# Patient Record
Sex: FEMALE | Race: WHITE | NOT HISPANIC OR LATINO | Employment: OTHER | URBAN - METROPOLITAN AREA
[De-identification: names, ages, dates, MRNs, and addresses within clinical notes are randomized per-mention and may not be internally consistent; named-entity substitution may affect disease eponyms.]

---

## 2024-05-13 ENCOUNTER — OFFICE VISIT (OUTPATIENT)
Dept: CARDIAC SURGERY | Facility: CLINIC | Age: 89
End: 2024-05-13
Payer: MEDICARE

## 2024-05-13 ENCOUNTER — OFFICE VISIT (OUTPATIENT)
Dept: NEUROSURGERY | Facility: CLINIC | Age: 89
End: 2024-05-13
Payer: MEDICARE

## 2024-05-13 VITALS
HEART RATE: 96 BPM | WEIGHT: 209 LBS | HEIGHT: 66 IN | TEMPERATURE: 97.5 F | DIASTOLIC BLOOD PRESSURE: 100 MMHG | BODY MASS INDEX: 33.59 KG/M2 | SYSTOLIC BLOOD PRESSURE: 160 MMHG | OXYGEN SATURATION: 95 %

## 2024-05-13 VITALS
TEMPERATURE: 97.8 F | WEIGHT: 209.4 LBS | SYSTOLIC BLOOD PRESSURE: 192 MMHG | HEART RATE: 110 BPM | OXYGEN SATURATION: 94 % | DIASTOLIC BLOOD PRESSURE: 110 MMHG

## 2024-05-13 DIAGNOSIS — I65.22 CAROTID STENOSIS, ASYMPTOMATIC, LEFT: Primary | ICD-10-CM

## 2024-05-13 DIAGNOSIS — I65.23 CAROTID STENOSIS, SYMPTOMATIC W/O INFARCT, BILATERAL: Primary | ICD-10-CM

## 2024-05-13 PROCEDURE — 1160F RVW MEDS BY RX/DR IN RCRD: CPT | Performed by: RADIOLOGY

## 2024-05-13 PROCEDURE — 1159F MED LIST DOCD IN RCRD: CPT | Performed by: RADIOLOGY

## 2024-05-13 PROCEDURE — 99204 OFFICE O/P NEW MOD 45 MIN: CPT | Performed by: THORACIC SURGERY (CARDIOTHORACIC VASCULAR SURGERY)

## 2024-05-13 PROCEDURE — 99204 OFFICE O/P NEW MOD 45 MIN: CPT | Performed by: RADIOLOGY

## 2024-05-13 RX ORDER — SODIUM CHLORIDE 9 MG/ML
40 INJECTION, SOLUTION INTRAVENOUS AS NEEDED
OUTPATIENT
Start: 2024-05-13

## 2024-05-13 RX ORDER — METOPROLOL SUCCINATE 25 MG/1
25 TABLET, EXTENDED RELEASE ORAL DAILY
COMMUNITY

## 2024-05-13 RX ORDER — MECLIZINE HCL 12.5 MG/1
12.5 TABLET ORAL 3 TIMES DAILY PRN
COMMUNITY
Start: 2024-03-06

## 2024-05-13 RX ORDER — CLOPIDOGREL BISULFATE 75 MG/1
75 TABLET ORAL DAILY
Qty: 30 TABLET | Refills: 3 | Status: SHIPPED | OUTPATIENT
Start: 2024-05-13

## 2024-05-13 RX ORDER — SODIUM CHLORIDE 0.9 % (FLUSH) 0.9 %
10 SYRINGE (ML) INJECTION EVERY 12 HOURS SCHEDULED
OUTPATIENT
Start: 2024-05-13

## 2024-05-13 RX ORDER — PROPAFENONE HYDROCHLORIDE 150 MG/1
1 TABLET, COATED ORAL 3 TIMES DAILY
COMMUNITY
Start: 2024-04-16

## 2024-05-13 RX ORDER — EVOLOCUMAB 140 MG/ML
INJECTION, SOLUTION SUBCUTANEOUS
COMMUNITY

## 2024-05-13 RX ORDER — ONDANSETRON 4 MG/1
4 TABLET, ORALLY DISINTEGRATING ORAL
COMMUNITY
Start: 2024-03-19

## 2024-05-13 RX ORDER — LORATADINE 10 MG/1
1 TABLET ORAL DAILY
COMMUNITY

## 2024-05-13 RX ORDER — DIPHENOXYLATE HYDROCHLORIDE AND ATROPINE SULFATE 2.5; .025 MG/1; MG/1
TABLET ORAL EVERY 24 HOURS
COMMUNITY

## 2024-05-13 RX ORDER — VALSARTAN 320 MG/1
320 TABLET ORAL DAILY
COMMUNITY

## 2024-05-13 RX ORDER — POTASSIUM CHLORIDE 750 MG/1
10 CAPSULE, EXTENDED RELEASE ORAL DAILY
COMMUNITY
Start: 2024-05-01

## 2024-05-13 RX ORDER — PRAVASTATIN SODIUM 80 MG/1
80 TABLET ORAL
COMMUNITY

## 2024-05-13 RX ORDER — SODIUM CHLORIDE 9 MG/ML
50 INJECTION, SOLUTION INTRAVENOUS CONTINUOUS
OUTPATIENT
Start: 2024-05-13

## 2024-05-13 RX ORDER — SODIUM CHLORIDE 0.9 % (FLUSH) 0.9 %
1-10 SYRINGE (ML) INJECTION AS NEEDED
OUTPATIENT
Start: 2024-05-13

## 2024-05-13 RX ORDER — HYDRALAZINE HYDROCHLORIDE 25 MG/1
25 TABLET, FILM COATED ORAL
COMMUNITY
Start: 2024-04-29

## 2024-05-13 RX ORDER — FUROSEMIDE 20 MG/1
20 TABLET ORAL DAILY PRN
COMMUNITY
Start: 2024-05-01

## 2024-05-13 RX ORDER — RIVAROXABAN 20 MG/1
20 TABLET, FILM COATED ORAL
COMMUNITY

## 2024-05-13 NOTE — PROGRESS NOTES
05/13/2024  Patient Information  Kim Ramsay                                                                                          [address]  [place] [address]   10/28/1934  'PCP/Referring Physician'  Geno CornellDO  [phone]  Galo Rubin MD  [phone]  Chief Complaint   Patient presents with    Carotid Artery Disease     Referred by Dr. Galo Rubin for bilateral carotid stenosis. Patient has episodes of dizziness with nausea, hx of A-fib.        History of Present Illness:   The patient is a 79-year-old female who is followed by Dr. Rubni after a heart attack.  She has a history of A-fib and she has been having dizziness.  She had a carotid duplex which reveals bilateral carotid disease and underwent a CTA of her carotid arteries.  This revealed 80% right internal carotid artery stenosis and 90% left.  She has been referred for evaluation and possible treatment of this.  She does have severe ankle edema.      Patient Active Problem List   Diagnosis    Carotid stenosis, asymptomatic, left     Past Medical History:   Diagnosis Date    Arthritis     Atrial fibrillation     Deep vein thrombosis     Hyperlipidemia     Hypertension     Myocardial infarction     Pre-diabetes     UTI (urinary tract infection)      Past Surgical History:   Procedure Laterality Date    CATARACT EXTRACTION Bilateral     CHOLECYSTECTOMY      HYSTERECTOMY         Current Outpatient Medications:     furosemide (LASIX) 20 MG tablet, Take 1 tablet by mouth Daily As Needed., Disp: , Rfl:     hydrALAZINE (APRESOLINE) 25 MG tablet, Take 1 tablet by mouth., Disp: , Rfl:     loratadine (CLARITIN) 10 MG tablet, Take 1 tablet by mouth Daily., Disp: , Rfl:     meclizine (ANTIVERT) 12.5 MG tablet, Take 1 tablet by mouth 3 (Three) Times a Day As Needed for Dizziness., Disp: , Rfl:     metoprolol succinate XL (TOPROL-XL) 25 MG 24 hr tablet, Take 1 tablet by mouth Daily., Disp: , Rfl:     ondansetron ODT  (ZOFRAN-ODT) 4 MG disintegrating tablet, 1 tablet., Disp: , Rfl:     potassium chloride (MICRO-K) 10 MEQ CR capsule, Take 1 capsule by mouth Daily., Disp: , Rfl:     pravastatin (PRAVACHOL) 80 MG tablet, Take 1 tablet by mouth every night at bedtime., Disp: , Rfl:     propafenone (RYTHMOL) 150 MG tablet, Take 1 tablet by mouth 3 times a day., Disp: , Rfl:     Repatha SureClick solution auto-injector SureClick injection, INJECT 1 ML SUBCUTANEOUSLY EVERY TWO WEEKS, Disp: , Rfl:     valsartan (DIOVAN) 320 MG tablet, Take 1 tablet by mouth Daily., Disp: , Rfl:     Xarelto 20 MG tablet, 1 tablet., Disp: , Rfl:     clopidogrel (Plavix) 75 MG tablet, Take 1 tablet by mouth Daily. Start taking 5 days before procedure, Disp: 30 tablet, Rfl: 3    multivitamin (THERAGRAN) tablet tablet, Daily., Disp: , Rfl:   Allergies   Allergen Reactions    Ciprofloxacin Other (See Comments)     Tightness in chest      Sulfamethoxazole-Trimethoprim GI Intolerance     Social History     Socioeconomic History    Marital status:     Number of children: 2   Tobacco Use    Smoking status: Never    Smokeless tobacco: Never   Vaping Use    Vaping status: Never Used   Substance and Sexual Activity    Alcohol use: Never    Drug use: Never    Sexual activity: Defer     Family History   Problem Relation Age of Onset    Heart attack Mother     Stroke Mother     Breast cancer Mother     COPD Father     Aneurysm Brother     Heart attack Brother      Review of Systems   Constitutional: Negative for chills, decreased appetite, diaphoresis, fever, malaise/fatigue, night sweats, weight gain and weight loss.   HENT:  Negative for hoarse voice.    Eyes:  Negative for blurred vision, double vision and visual disturbance.   Cardiovascular:  Positive for dyspnea on exertion and leg swelling. Negative for chest pain, claudication, irregular heartbeat, near-syncope, orthopnea, palpitations, paroxysmal nocturnal dyspnea and syncope.   Respiratory:  Negative  for cough, hemoptysis, shortness of breath, sputum production and wheezing.    Hematologic/Lymphatic: Negative for adenopathy and bleeding problem. Does not bruise/bleed easily.   Skin:  Negative for color change, nail changes, poor wound healing and rash.   Musculoskeletal:  Positive for arthritis. Negative for back pain, falls and muscle cramps.   Gastrointestinal:  Negative for abdominal pain, dysphagia and heartburn.   Genitourinary:  Negative for flank pain.   Neurological:  Positive for dizziness, light-headedness and loss of balance. Negative for brief paralysis, disturbances in coordination, focal weakness, headaches, numbness, paresthesias, sensory change, vertigo and weakness.   Psychiatric/Behavioral:  Negative for depression and suicidal ideas.    Allergic/Immunologic: Negative for persistent infections.       Vitals:    05/13/24 1222   BP: (!) 192/110   BP Location: Left arm   Patient Position: Sitting   Pulse: 110   Temp: 97.8 °F (36.6 °C)   SpO2: 94%   Weight: 95 kg (209 lb 6.4 oz)      Physical Exam  Vitals and nursing note reviewed.   Constitutional:       General: She is not in acute distress.     Appearance: She is well-developed.   HENT:      Head: Normocephalic.   Eyes:      Conjunctiva/sclera: Conjunctivae normal.      Pupils: Pupils are equal, round, and reactive to light.   Neck:      Thyroid: No thyroid mass or thyromegaly.   Cardiovascular:      Rate and Rhythm: Normal rate.      Pulses:           Carotid pulses are  on the right side with bruit and  on the left side with bruit.     Heart sounds: No murmur heard.     No friction rub. No gallop.   Pulmonary:      Breath sounds: No wheezing, rhonchi or rales.   Abdominal:      General: Bowel sounds are normal. There is no distension.      Palpations: Abdomen is soft. There is no mass.      Tenderness: There is no abdominal tenderness.   Musculoskeletal:         General: No deformity. Normal range of motion.      Cervical back: Normal range of  motion.   Skin:     Findings: No petechiae.      Nails: There is no clubbing.   Neurological:      Mental Status: She is oriented to person, place, and time.      Cranial Nerves: No cranial nerve deficit.      Sensory: No sensory deficit.   Psychiatric:         Behavior: Behavior normal.         The ROS, past medical history, surgical history, family history, social history, and vitals were reviewed by myself and corrected as needed.      Labs/Imaging:  I have obtained and reviewed the medical records from Dr. Rubin including the CTA scan demonstrating severe bilateral carotid artery disease.     Assessment/Plan:   The patient is an 89-year-old female who has A-fib and severe dizziness.  She has severe bilateral carotid artery disease.  I reviewed her CTA and concur with this interpretation.  I have reviewed this also with Dr. Sanjay Sneed, our neuroradiologist.  He will see the patient today and discuss possible catheter-based intervention for this.  In view of her A-fib, ankle edema and her age, this may be the best option.  We will get Dr. Sneed's opinion.  I have not ruled out surgery but certainly, if he feels comfortable, it might be the best option.  I discussed that with both her and her daughters and they appear to understand and agreeable. The patient does not have an advance directive on file at this time. I would like to thank you for this consultation.    Patient Active Problem List   Diagnosis    Carotid stenosis, asymptomatic, left     CC: DO Galo Valerio MD Regina Fugate editing for Edgar Kunz MD       I, Edgar Kunz MD, have read and agree with the editing done by Shaista Younger, .

## 2024-05-13 NOTE — H&P (VIEW-ONLY)
"NAME: ROSA CORTEZ   DOS: 2024  : 10/28/1934  PCP: Geno Cornell DO    Chief Complaint:    Chief Complaint   Patient presents with    carotid stenosis       History of Present Illness:  89 y.o. female being seen for high-grade bilateral carotid stenoses.  Ms. Cortez has been having recurrent episodes (nearly weekly) of dizziness with accompanying nausea/vomiting.  As part of her workup/evaluation she underwent CT angiography which suggests high-grade bilateral carotid stenoses.  She was seen in the CT surgery clinic today (Dr. Kunz), but given her age and comorbidities (atrial fibrillation, coronary artery disease), she was deemed a \"high surgical risk\" for endarterectomy.  She denies any localizing neurologic symptoms, specifically no unilateral weakness/numbness, no speech or vision changes.  She is on a chronic Xarelto anticoagulation regimen.  I am seeing her in consultation regarding her carotid occlusive disease.    Past Medical History:  Past Medical History:   Diagnosis Date    Arthritis     Atrial fibrillation     Deep vein thrombosis     Hyperlipidemia     Hypertension     Myocardial infarction     Pre-diabetes     UTI (urinary tract infection)        Past Surgical History:  Past Surgical History:   Procedure Laterality Date    CATARACT EXTRACTION Bilateral     CHOLECYSTECTOMY      HYSTERECTOMY         Review of Systems:        Review of Systems   Constitutional:  Positive for fatigue. Negative for activity change, appetite change, chills, diaphoresis, fever and unexpected weight change.   HENT:  Negative for congestion, dental problem, drooling, ear discharge, ear pain, facial swelling, hearing loss, mouth sores, nosebleeds, postnasal drip, rhinorrhea, sinus pressure, sinus pain, sneezing, sore throat, tinnitus, trouble swallowing and voice change.    Eyes:  Negative for photophobia, pain, discharge, redness, itching and visual disturbance.   Respiratory:  Positive for " shortness of breath. Negative for apnea, cough, choking, chest tightness, wheezing and stridor.    Cardiovascular:  Positive for leg swelling. Negative for chest pain and palpitations.   Gastrointestinal:  Positive for nausea. Negative for abdominal distention, abdominal pain, anal bleeding, blood in stool, constipation, diarrhea, rectal pain and vomiting.   Endocrine: Negative for cold intolerance, heat intolerance, polydipsia, polyphagia and polyuria.   Genitourinary:  Negative for decreased urine volume, difficulty urinating, dysuria, enuresis, flank pain, frequency, genital sores, hematuria and urgency.   Musculoskeletal:  Negative for arthralgias, back pain, gait problem, joint swelling, myalgias, neck pain and neck stiffness.   Skin:  Negative for color change, pallor, rash and wound.   Allergic/Immunologic: Negative for environmental allergies, food allergies and immunocompromised state.   Neurological:  Negative for dizziness, tremors, seizures, syncope, facial asymmetry, speech difficulty, weakness, light-headedness, numbness and headaches.   Hematological:  Negative for adenopathy. Does not bruise/bleed easily.   Psychiatric/Behavioral:  Negative for agitation, behavioral problems, confusion, decreased concentration, dysphoric mood, hallucinations, self-injury, sleep disturbance and suicidal ideas. The patient is not nervous/anxious and is not hyperactive.         Medications    Current Outpatient Medications:     furosemide (LASIX) 20 MG tablet, Take 1 tablet by mouth Daily As Needed., Disp: , Rfl:     hydrALAZINE (APRESOLINE) 25 MG tablet, Take 1 tablet by mouth., Disp: , Rfl:     loratadine (CLARITIN) 10 MG tablet, Take 1 tablet by mouth Daily., Disp: , Rfl:     meclizine (ANTIVERT) 12.5 MG tablet, Take 1 tablet by mouth 3 (Three) Times a Day As Needed for Dizziness., Disp: , Rfl:     metoprolol succinate XL (TOPROL-XL) 25 MG 24 hr tablet, Take 1 tablet by mouth Daily., Disp: , Rfl:     multivitamin  (THERAGRAN) tablet tablet, Daily., Disp: , Rfl:     ondansetron ODT (ZOFRAN-ODT) 4 MG disintegrating tablet, 1 tablet., Disp: , Rfl:     potassium chloride (MICRO-K) 10 MEQ CR capsule, Take 1 capsule by mouth Daily., Disp: , Rfl:     pravastatin (PRAVACHOL) 80 MG tablet, Take 1 tablet by mouth every night at bedtime., Disp: , Rfl:     propafenone (RYTHMOL) 150 MG tablet, Take 1 tablet by mouth 3 times a day., Disp: , Rfl:     Repatha SureClick solution auto-injector SureClick injection, INJECT 1 ML SUBCUTANEOUSLY EVERY TWO WEEKS, Disp: , Rfl:     valsartan (DIOVAN) 320 MG tablet, Take 1 tablet by mouth Daily., Disp: , Rfl:     Xarelto 20 MG tablet, 1 tablet., Disp: , Rfl:     clopidogrel (Plavix) 75 MG tablet, Take 1 tablet by mouth Daily. Start taking 5 days before procedure, Disp: 30 tablet, Rfl: 3    Allergies:  Allergies   Allergen Reactions    Ciprofloxacin Other (See Comments)     Tightness in chest      Sulfamethoxazole-Trimethoprim GI Intolerance       Social Hx:  Social History     Tobacco Use    Smoking status: Never    Smokeless tobacco: Never   Vaping Use    Vaping status: Never Used   Substance Use Topics    Alcohol use: Never    Drug use: Never       Family Hx:  Family History   Problem Relation Age of Onset    Heart attack Mother     Stroke Mother     Breast cancer Mother     COPD Father     Aneurysm Brother     Heart attack Brother        Review of Imaging:  Outside CT angiogram dated 3/19/2024 from [facility] was reviewed along with its corresponding radiologic report.  This study does demonstrate extensive calcific and lipid-laden plaque at the bilateral carotid bifurcations (left greater than right).  The calcific nature of the plaque makes it challenging to accurately depict the severity of stenosis, but I do think there is a high-grade (greater than 90%) left ICA origin stenosis.  I am hoping that the calcific nature is overestimating the severity of stenosis at the right  "carotid bifurcation, but this is ordinarily at least borderline hemodynamically significant (70%) in nature.  Her vertebrobasilar system appears widely patent, without lesion to suggest a vertebrobasilar insufficiency.    Physical Examination:  Vitals:    05/13/24 1344   BP: 160/100   Pulse: 96   Temp: 97.5 °F (36.4 °C)   SpO2: 95%        General Appearance:   Well developed, well nourished, well groomed, alert, and cooperative.  Cardiovascular: Regular rate and rhythm. Soft left carotid bruit.      Neurological examination:  Nonfocal neurologic exam.  Speech is clear.  No facial droop or pronator drift.  I do not appreciate any gross visual field deficits.  She has 3+/5 strength in the bilateral upper and lower extremities.  She does ambulate with assistance of a cane for stability purposes and secondary to arthritis in her knees.    Diagnoses/Plan:    Ms. Ramsay is a 89 y.o. female high-grade, bilateral (left greater than right) carotid stenosis found during evaluation of episodes of dizziness and nausea.  I have reviewed the CT angiogram, and I do think that she has a high-grade (greater than 90%) left carotid stenosis, but I am hoping that the calcific nature of plaque is overestimating the severity of stenosis at the right carotid bifurcation.  I do not appreciate any vertebrobasilar lesions that would be contributing to any sort of vertebrobasilar insufficiency or account for her dizziness.  She was seen by CT surgery earlier today, and given her cardiac comorbidities, she is deemed a \"high surgical risk\" for endarterectomy.  As such, had a lengthy discussion with Ms. Ramsay and family in regards to treatment options for what is most likely asymptomatic left carotid stenosis, to include continued conservative management versus angioplasty/stent placement.  I again reiterated that it was doubtful that her carotids were contributing to her symptoms, and that the main reason for treating her left carotid " stenosis would be to decrease her risk of stroke.  They expressed an understanding, and after deliberation, they wish to proceed accordingly with diagnostic carotid angiography along with left carotid angioplasty/stent placement.  She will need to add Plavix to her Xarelto regimen leading up to the procedure, and we called in a prescription to her pharmacy.  We will tentatively schedule her for diagnostic angiogram and left carotid stent placement (via femoral access) in the next couple of weeks or so.  During the interim, she will contact our office in/or call 911 if she experiences any stroke or TIA-like symptoms.

## 2024-05-17 ENCOUNTER — TELEPHONE (OUTPATIENT)
Dept: CARDIAC SURGERY | Facility: CLINIC | Age: 89
End: 2024-05-17
Payer: MEDICARE

## 2024-05-24 ENCOUNTER — HOSPITAL ENCOUNTER (INPATIENT)
Facility: HOSPITAL | Age: 89
LOS: 1 days | Discharge: HOME | DRG: 036 | End: 2024-05-25
Attending: RADIOLOGY | Admitting: RADIOLOGY
Payer: MEDICARE

## 2024-05-24 ENCOUNTER — APPOINTMENT (OUTPATIENT)
Dept: CARDIOLOGY | Facility: HOSPITAL | Age: 89
DRG: 036 | End: 2024-05-24
Payer: MEDICARE

## 2024-05-24 DIAGNOSIS — I65.22 CAROTID STENOSIS, ASYMPTOMATIC, LEFT: ICD-10-CM

## 2024-05-24 PROBLEM — I10 HTN (HYPERTENSION): Status: ACTIVE | Noted: 2024-05-24

## 2024-05-24 PROBLEM — I10 HTN (HYPERTENSION): Chronic | Status: ACTIVE | Noted: 2024-05-24

## 2024-05-24 PROBLEM — I48.91 A-FIB: Chronic | Status: ACTIVE | Noted: 2024-05-24

## 2024-05-24 PROBLEM — E78.5 HYPERLIPIDEMIA LDL GOAL <100: Status: ACTIVE | Noted: 2024-05-24

## 2024-05-24 PROBLEM — R73.03 PREDIABETES: Status: ACTIVE | Noted: 2024-05-24

## 2024-05-24 PROBLEM — R73.03 PREDIABETES: Chronic | Status: ACTIVE | Noted: 2024-05-24

## 2024-05-24 PROBLEM — E78.5 HYPERLIPIDEMIA LDL GOAL <100: Chronic | Status: ACTIVE | Noted: 2024-05-24

## 2024-05-24 LAB
ANION GAP SERPL CALCULATED.3IONS-SCNC: 7 MMOL/L (ref 5–15)
BH CV LEFT CCA HIDDEN LRR: 1 CM/S
BH CV VAS CAROTID LEFT DISTAL STENT EDV: 16.8 CM/S
BH CV VAS CAROTID LEFT DISTAL STENT: 78.9 CM/S
BH CV VAS CAROTID LEFT DISTAL TO STENT EDV: 16.8 CM/S
BH CV VAS CAROTID LEFT DISTAL TO STENT: 100 CM/S
BH CV VAS CAROTID LEFT MID STENT EDV: 19.3 CM/S
BH CV VAS CAROTID LEFT MID STENT: 82 CM/S
BH CV VAS CAROTID LEFT PROXIMAL STENT EDV: 11.2 CM/S
BH CV VAS CAROTID LEFT PROXIMAL STENT: 61.5 CM/S
BH CV VAS CAROTID LEFT PROXIMAL TO STENT: 83.9 CM/S
BH CV VAS CAROTID LEFT STENT NATIVE VESSEL PROXIMAL ED: 13 CM/S
BH CV XLRA MEAS LEFT DIST CCA EDV: 13 CM/SEC
BH CV XLRA MEAS LEFT DIST CCA PSV: 83.9 CM/SEC
BH CV XLRA MEAS LEFT DIST ICA EDV: 19.3 CM/SEC
BH CV XLRA MEAS LEFT DIST ICA PSV: 73.9 CM/SEC
BH CV XLRA MEAS LEFT ICA/CCA RATIO: 1.55
BH CV XLRA MEAS LEFT MID CCA EDV: 17.4 CM/SEC
BH CV XLRA MEAS LEFT MID CCA PSV: 64.6 CM/SEC
BH CV XLRA MEAS LEFT MID ICA EDV: 16.8 CM/SEC
BH CV XLRA MEAS LEFT MID ICA PSV: 100 CM/SEC
BH CV XLRA MEAS LEFT PROX CCA PSV: 17.4 CM/SEC
BH CV XLRA MEAS LEFT PROX ECA PSV: 180 CM/SEC
BH CV XLRA MEAS LEFT PROX ICA EDV: 16.8 CM/SEC
BH CV XLRA MEAS LEFT PROX ICA PSV: 98.8 CM/SEC
BH CV XLRA MEAS LEFT PROX SCLA PSV: 83.9 CM/SEC
BH CV XLRA MEAS LEFT VERTEBRAL A EDV: 14.9 CM/SEC
BH CV XLRA MEAS LEFT VERTEBRAL A PSV: 57.8 CM/SEC
BH CV XLRA MEAS RIGHT DIST CCA EDV: 14.1 CM/SEC
BH CV XLRA MEAS RIGHT DIST CCA PSV: 103 CM/SEC
BH CV XLRA MEAS RIGHT DIST ICA EDV: 11.9 CM/SEC
BH CV XLRA MEAS RIGHT DIST ICA PSV: 48.7 CM/SEC
BH CV XLRA MEAS RIGHT ICA/CCA RATIO: 0.87
BH CV XLRA MEAS RIGHT MID CCA EDV: 19.8 CM/SEC
BH CV XLRA MEAS RIGHT MID CCA PSV: 156 CM/SEC
BH CV XLRA MEAS RIGHT MID ICA EDV: 18 CM/SEC
BH CV XLRA MEAS RIGHT MID ICA PSV: 111 CM/SEC
BH CV XLRA MEAS RIGHT PROX CCA EDV: 8.2 CM/SEC
BH CV XLRA MEAS RIGHT PROX CCA PSV: 59.6 CM/SEC
BH CV XLRA MEAS RIGHT PROX ECA PSV: 83.9 CM/SEC
BH CV XLRA MEAS RIGHT PROX ICA EDV: 24.1 CM/SEC
BH CV XLRA MEAS RIGHT PROX ICA PSV: 136 CM/SEC
BH CV XLRA MEAS RIGHT PROX SCLA PSV: 87 CM/SEC
BH CV XLRA MEAS RIGHT VERTEBRAL A EDV: 8.8 CM/SEC
BH CV XLRA MEAS RIGHT VERTEBRAL A PSV: 35.6 CM/SEC
BH CVPROX LEFT ICA HIDDEN LRR: 1 CM
BUN SERPL-MCNC: 11 MG/DL (ref 8–23)
BUN/CREAT SERPL: 11.5 (ref 7–25)
CALCIUM SPEC-SCNC: 9.6 MG/DL (ref 8.6–10.5)
CHLORIDE SERPL-SCNC: 99 MMOL/L (ref 98–107)
CO2 SERPL-SCNC: 29 MMOL/L (ref 22–29)
CREAT SERPL-MCNC: 0.96 MG/DL (ref 0.57–1)
DEPRECATED RDW RBC AUTO: 49.2 FL (ref 37–54)
EGFRCR SERPLBLD CKD-EPI 2021: 56.7 ML/MIN/1.73
ERYTHROCYTE [DISTWIDTH] IN BLOOD BY AUTOMATED COUNT: 14.3 % (ref 12.3–15.4)
GLUCOSE SERPL-MCNC: 98 MG/DL (ref 65–99)
HCT VFR BLD AUTO: 39.4 % (ref 34–46.6)
HGB BLD-MCNC: 12.8 G/DL (ref 12–15.9)
MCH RBC QN AUTO: 30.6 PG (ref 26.6–33)
MCHC RBC AUTO-ENTMCNC: 32.5 G/DL (ref 31.5–35.7)
MCV RBC AUTO: 94.3 FL (ref 79–97)
PLATELET # BLD AUTO: 251 10*3/MM3 (ref 140–450)
PMV BLD AUTO: 8.8 FL (ref 6–12)
POTASSIUM SERPL-SCNC: 4.1 MMOL/L (ref 3.5–5.2)
RBC # BLD AUTO: 4.18 10*6/MM3 (ref 3.77–5.28)
RIGHT ARM BP: NORMAL MMHG
SODIUM SERPL-SCNC: 135 MMOL/L (ref 136–145)
WBC NRBC COR # BLD AUTO: 5.01 10*3/MM3 (ref 3.4–10.8)

## 2024-05-24 PROCEDURE — C1884 EMBOLIZATION PROTECT SYST: HCPCS | Performed by: RADIOLOGY

## 2024-05-24 PROCEDURE — C1725 CATH, TRANSLUMIN NON-LASER: HCPCS | Performed by: RADIOLOGY

## 2024-05-24 PROCEDURE — B31RYZZ FLUOROSCOPY OF INTRACRANIAL ARTERIES USING OTHER CONTRAST: ICD-10-PCS | Performed by: RADIOLOGY

## 2024-05-24 PROCEDURE — 92610 EVALUATE SWALLOWING FUNCTION: CPT

## 2024-05-24 PROCEDURE — C1769 GUIDE WIRE: HCPCS | Performed by: RADIOLOGY

## 2024-05-24 PROCEDURE — 25010000002 HEPARIN (PORCINE) PER 1000 UNITS: Performed by: RADIOLOGY

## 2024-05-24 PROCEDURE — B315YZZ FLUOROSCOPY OF BILATERAL COMMON CAROTID ARTERIES USING OTHER CONTRAST: ICD-10-PCS | Performed by: RADIOLOGY

## 2024-05-24 PROCEDURE — 93880 EXTRACRANIAL BILAT STUDY: CPT

## 2024-05-24 PROCEDURE — 0 IODIXANOL PER 1 ML: Performed by: RADIOLOGY

## 2024-05-24 PROCEDURE — C1894 INTRO/SHEATH, NON-LASER: HCPCS | Performed by: RADIOLOGY

## 2024-05-24 PROCEDURE — 25810000003 SODIUM CHLORIDE 0.9 % SOLUTION: Performed by: PHYSICIAN ASSISTANT

## 2024-05-24 PROCEDURE — X2AJ336 CEREBRAL EMBOLIC FILTRATION, EXTRACORPOREAL FLOW REVERSAL CIRCUIT FROM LEFT COMMON CAROTID ARTERY, PERCUTANEOUS APPROACH, NEW TECHNOLOGY GROUP 6: ICD-10-PCS | Performed by: RADIOLOGY

## 2024-05-24 PROCEDURE — 93880 EXTRACRANIAL BILAT STUDY: CPT | Performed by: INTERNAL MEDICINE

## 2024-05-24 PROCEDURE — 037L3DZ DILATION OF LEFT INTERNAL CAROTID ARTERY WITH INTRALUMINAL DEVICE, PERCUTANEOUS APPROACH: ICD-10-PCS | Performed by: RADIOLOGY

## 2024-05-24 PROCEDURE — 37215 TRANSCATH STENT CCA W/EPS: CPT | Performed by: RADIOLOGY

## 2024-05-24 PROCEDURE — 36225 PLACE CATH SUBCLAVIAN ART: CPT | Performed by: RADIOLOGY

## 2024-05-24 PROCEDURE — 25810000003 SODIUM CHLORIDE 0.9 % SOLUTION: Performed by: RADIOLOGY

## 2024-05-24 PROCEDURE — 85027 COMPLETE CBC AUTOMATED: CPT | Performed by: RADIOLOGY

## 2024-05-24 PROCEDURE — 99232 SBSQ HOSP IP/OBS MODERATE 35: CPT

## 2024-05-24 PROCEDURE — C1876 STENT, NON-COA/NON-COV W/DEL: HCPCS | Performed by: RADIOLOGY

## 2024-05-24 PROCEDURE — 25810000003 SODIUM CHLORIDE 0.9 % SOLUTION

## 2024-05-24 PROCEDURE — 80048 BASIC METABOLIC PNL TOTAL CA: CPT | Performed by: RADIOLOGY

## 2024-05-24 PROCEDURE — C1760 CLOSURE DEV, VASC: HCPCS | Performed by: RADIOLOGY

## 2024-05-24 PROCEDURE — 25010000002 FENTANYL CITRATE (PF) 50 MCG/ML SOLUTION: Performed by: RADIOLOGY

## 2024-05-24 PROCEDURE — B31GYZZ FLUOROSCOPY OF BILATERAL VERTEBRAL ARTERIES USING OTHER CONTRAST: ICD-10-PCS | Performed by: RADIOLOGY

## 2024-05-24 PROCEDURE — 36223 PLACE CATH CAROTID/INOM ART: CPT | Performed by: RADIOLOGY

## 2024-05-24 DEVICE — XACT CAROTID STENT SYSTEM 10.0 MM X 40 MM TAPERED
Type: IMPLANTABLE DEVICE | Status: FUNCTIONAL
Brand: XACT

## 2024-05-24 RX ORDER — PROPAFENONE HYDROCHLORIDE 150 MG/1
150 TABLET, COATED ORAL EVERY 8 HOURS SCHEDULED
Status: DISCONTINUED | OUTPATIENT
Start: 2024-05-25 | End: 2024-05-25 | Stop reason: HOSPADM

## 2024-05-24 RX ORDER — VALSARTAN 160 MG/1
320 TABLET ORAL DAILY
Status: DISCONTINUED | OUTPATIENT
Start: 2024-05-25 | End: 2024-05-25 | Stop reason: HOSPADM

## 2024-05-24 RX ORDER — PRAVASTATIN SODIUM 40 MG
80 TABLET ORAL DAILY
Status: DISCONTINUED | OUTPATIENT
Start: 2024-05-26 | End: 2024-05-25 | Stop reason: HOSPADM

## 2024-05-24 RX ORDER — SODIUM CHLORIDE 0.9 % (FLUSH) 0.9 %
1-10 SYRINGE (ML) INJECTION AS NEEDED
Status: DISCONTINUED | OUTPATIENT
Start: 2024-05-24 | End: 2024-05-25 | Stop reason: HOSPADM

## 2024-05-24 RX ORDER — ONDANSETRON 4 MG/1
4 TABLET, ORALLY DISINTEGRATING ORAL EVERY 8 HOURS PRN
Status: DISCONTINUED | OUTPATIENT
Start: 2024-05-24 | End: 2024-05-25 | Stop reason: HOSPADM

## 2024-05-24 RX ORDER — MECLIZINE HCL 12.5 MG/1
12.5 TABLET ORAL 3 TIMES DAILY PRN
Status: DISCONTINUED | OUTPATIENT
Start: 2024-05-24 | End: 2024-05-25 | Stop reason: HOSPADM

## 2024-05-24 RX ORDER — PROPAFENONE HYDROCHLORIDE 150 MG/1
150 TABLET, COATED ORAL 3 TIMES DAILY
Status: DISCONTINUED | OUTPATIENT
Start: 2024-05-24 | End: 2024-05-24

## 2024-05-24 RX ORDER — SODIUM CHLORIDE 9 MG/ML
75 INJECTION, SOLUTION INTRAVENOUS CONTINUOUS
Status: ACTIVE | OUTPATIENT
Start: 2024-05-24 | End: 2024-05-24

## 2024-05-24 RX ORDER — LIDOCAINE HYDROCHLORIDE 10 MG/ML
INJECTION, SOLUTION EPIDURAL; INFILTRATION; INTRACAUDAL; PERINEURAL
Status: DISCONTINUED | OUTPATIENT
Start: 2024-05-24 | End: 2024-05-24 | Stop reason: HOSPADM

## 2024-05-24 RX ORDER — SODIUM CHLORIDE 9 MG/ML
1000 INJECTION, SOLUTION INTRAVENOUS ONCE
Status: COMPLETED | OUTPATIENT
Start: 2024-05-24 | End: 2024-05-24

## 2024-05-24 RX ORDER — FENTANYL CITRATE 50 UG/ML
INJECTION, SOLUTION INTRAMUSCULAR; INTRAVENOUS
Status: DISCONTINUED | OUTPATIENT
Start: 2024-05-24 | End: 2024-05-24 | Stop reason: HOSPADM

## 2024-05-24 RX ORDER — POTASSIUM CHLORIDE 750 MG/1
10 CAPSULE, EXTENDED RELEASE ORAL DAILY
Status: DISCONTINUED | OUTPATIENT
Start: 2024-05-24 | End: 2024-05-25 | Stop reason: HOSPADM

## 2024-05-24 RX ORDER — SODIUM CHLORIDE 9 MG/ML
40 INJECTION, SOLUTION INTRAVENOUS AS NEEDED
Status: DISCONTINUED | OUTPATIENT
Start: 2024-05-24 | End: 2024-05-25 | Stop reason: HOSPADM

## 2024-05-24 RX ORDER — FUROSEMIDE 20 MG/1
20 TABLET ORAL DAILY
Status: DISCONTINUED | OUTPATIENT
Start: 2024-05-24 | End: 2024-05-25 | Stop reason: HOSPADM

## 2024-05-24 RX ORDER — SODIUM CHLORIDE 0.9 % (FLUSH) 0.9 %
10 SYRINGE (ML) INJECTION EVERY 12 HOURS SCHEDULED
Status: DISCONTINUED | OUTPATIENT
Start: 2024-05-24 | End: 2024-05-25 | Stop reason: HOSPADM

## 2024-05-24 RX ORDER — IODIXANOL 320 MG/ML
INJECTION, SOLUTION INTRAVASCULAR
Status: DISCONTINUED | OUTPATIENT
Start: 2024-05-24 | End: 2024-05-24 | Stop reason: HOSPADM

## 2024-05-24 RX ORDER — CLOPIDOGREL BISULFATE 75 MG/1
75 TABLET ORAL DAILY
Status: DISCONTINUED | OUTPATIENT
Start: 2024-05-24 | End: 2024-05-25 | Stop reason: HOSPADM

## 2024-05-24 RX ORDER — SODIUM CHLORIDE 0.9 % (FLUSH) 0.9 %
10 SYRINGE (ML) INJECTION AS NEEDED
Status: DISCONTINUED | OUTPATIENT
Start: 2024-05-24 | End: 2024-05-25 | Stop reason: HOSPADM

## 2024-05-24 RX ORDER — PRAVASTATIN SODIUM 40 MG
80 TABLET ORAL NIGHTLY
Status: DISCONTINUED | OUTPATIENT
Start: 2024-05-24 | End: 2024-05-24

## 2024-05-24 RX ORDER — SODIUM CHLORIDE 9 MG/ML
50 INJECTION, SOLUTION INTRAVENOUS CONTINUOUS
Status: DISCONTINUED | OUTPATIENT
Start: 2024-05-24 | End: 2024-05-25 | Stop reason: HOSPADM

## 2024-05-24 RX ORDER — CETIRIZINE HYDROCHLORIDE 10 MG/1
10 TABLET ORAL DAILY
Status: DISCONTINUED | OUTPATIENT
Start: 2024-05-24 | End: 2024-05-25 | Stop reason: HOSPADM

## 2024-05-24 RX ORDER — HYDRALAZINE HYDROCHLORIDE 25 MG/1
25 TABLET, FILM COATED ORAL 2 TIMES DAILY
Status: DISCONTINUED | OUTPATIENT
Start: 2024-05-24 | End: 2024-05-25 | Stop reason: HOSPADM

## 2024-05-24 RX ORDER — METOPROLOL SUCCINATE 25 MG/1
25 TABLET, EXTENDED RELEASE ORAL DAILY
Status: DISCONTINUED | OUTPATIENT
Start: 2024-05-24 | End: 2024-05-25 | Stop reason: HOSPADM

## 2024-05-24 RX ORDER — HEPARIN SODIUM 1000 [USP'U]/ML
INJECTION, SOLUTION INTRAVENOUS; SUBCUTANEOUS
Status: DISCONTINUED | OUTPATIENT
Start: 2024-05-24 | End: 2024-05-24 | Stop reason: HOSPADM

## 2024-05-24 RX ADMIN — SODIUM CHLORIDE 50 ML/HR: 9 INJECTION, SOLUTION INTRAVENOUS at 09:00

## 2024-05-24 RX ADMIN — Medication 10 ML: at 11:13

## 2024-05-24 RX ADMIN — Medication 10 ML: at 20:16

## 2024-05-24 RX ADMIN — SODIUM CHLORIDE 75 ML/HR: 9 INJECTION, SOLUTION INTRAVENOUS at 11:12

## 2024-05-24 RX ADMIN — SODIUM CHLORIDE 1000 ML: 9 INJECTION, SOLUTION INTRAVENOUS at 13:08

## 2024-05-24 RX ADMIN — PROPAFENONE HYDROCHLORIDE 150 MG: 150 TABLET, FILM COATED ORAL at 18:07

## 2024-05-24 NOTE — THERAPY EVALUATION
Acute Care - Speech Language Pathology   Swallow Initial Evaluation [facility]  Clinical Swallow Evaluation       Patient Name: Kim Ramsay  : 10/28/1934  MRN: [id number]  Today's Date: 2024               Admit Date: 2024    Visit Dx:     ICD-10-CM ICD-9-CM   1. Carotid stenosis, asymptomatic, left  I65.22 433.10     Patient Active Problem List   Diagnosis    Carotid stenosis, asymptomatic, left    A-fib    Hyperlipidemia    HTN (hypertension)    Prediabetes     Past Medical History:   Diagnosis Date    Arthritis     Atrial fibrillation     Deep vein thrombosis     Hyperlipidemia     Hypertension     Myocardial infarction     Pre-diabetes     UTI (urinary tract infection)      Past Surgical History:   Procedure Laterality Date    CATARACT EXTRACTION Bilateral     CHOLECYSTECTOMY      HYSTERECTOMY         SLP Recommendation and Plan  SLP Swallowing Diagnosis: swallow WFL/no suspected pharyngeal impairment (24)  SLP Diet Recommendation: regular textures, thin liquids (24)  Recommended Precautions and Strategies: upright posture during/after eating (24)  SLP Rec. for Method of Medication Administration: as tolerated (24)     Monitor for Signs of Aspiration: notify SLP if any concerns (24)  Recommended Diagnostics: No further SLP services recommended (24)  Swallow Criteria for Skilled Therapeutic Interventions Met: no problems identified which require skilled intervention (24)  Anticipated Discharge Disposition (SLP): No further SLP services warranted (24)     Therapy Frequency (Swallow): evaluation only (24)     Oral Care Recommendations: Oral Care BID/PRN, Toothbrush (24)                                        Plan of Care Reviewed With: patient      SWALLOW EVALUATION (Last 72 Hours)       SLP Adult Swallow Evaluation       Row Name 24                   Rehab Evaluation     Document Type discharge evaluation/summary  -DV        Subjective Information no complaints  -DV        Patient Observations alert;cooperative  -DV        Patient/Family/Caregiver Comments/Observations no family present  -DV        Patient Effort excellent  -DV        Symptoms Noted During/After Treatment none  -DV           General Information    Patient Profile Reviewed yes  -DV        Pertinent History Of Current Problem 53yoF w/ carotid stenosis s/p stent placement. Pt presented with stroke-like symptoms today including L facial droop and L hand numbness/tingling. Symptoms have largely improved. Pt not undergoing full stroke w/u at this time, but RN consulted SLP to ensure diet safety.  -DV        Current Method of Nutrition regular textures;thin liquids  -DV        Precautions/Limitations, Vision WFL;for purposes of eval  -DV        Precautions/Limitations, Hearing hearing impairment, bilaterally;other (see comments)  HA's not available  -DV        Prior Level of Function-Communication WFL  -DV        Prior Level of Function-Swallowing no diet consistency restrictions  -DV        Plans/Goals Discussed with patient;agreed upon  -DV        Barriers to Rehab none identified  -DV        Patient's Goals for Discharge patient did not state  -DV           Pain    Additional Documentation Pain Scale: Numbers Pre/Post-Treatment (Group)  -DV           Pain Scale: Numbers Pre/Post-Treatment    Pretreatment Pain Rating 0/10 - no pain  -DV        Posttreatment Pain Rating 0/10 - no pain  -DV           Oral Motor Structure and Function    Dentition Assessment natural, present and adequate  -DV        Secretion Management WNL/WFL  -DV        Mucosal Quality moist, healthy  -DV           Oral Musculature and Cranial Nerve Assessment    Oral Motor General Assessment oral labial or buccal impairment  -DV        Oral Labial or Buccal Impairment, Detail, Cranial Nerve VII (Facial): left labial droop;other (see comments)  mild  -DV            General Eating/Swallowing Observations    Respiratory Support Currently in Use room air  -DV        Eating/Swallowing Skills self-fed  -DV        Positioning During Eating upright 90 degree  -DV        Utensils Used spoon;cup;straw  -DV        Consistencies Trialed regular textures;pureed;thin liquids  -DV           Respiratory    Respiratory Status WFL  -DV           Clinical Swallow Eval    Oral Prep Phase WFL  -DV        Oral Transit WFL  -DV        Oral Residue WFL  -DV        Pharyngeal Phase no overt signs/symptoms of pharyngeal impairment  -DV        Esophageal Phase unremarkable  -DV           SLP Evaluation Clinical Impression    SLP Swallowing Diagnosis swallow WFL/no suspected pharyngeal impairment  -DV        Functional Impact no impact on function  -DV        Swallow Criteria for Skilled Therapeutic Interventions Met no problems identified which require skilled intervention  -DV           Recommendations    Therapy Frequency (Swallow) evaluation only  -DV        SLP Diet Recommendation regular textures;thin liquids  -DV        Recommended Diagnostics No further SLP services recommended  -DV        Recommended Precautions and Strategies upright posture during/after eating  -DV        Oral Care Recommendations Oral Care BID/PRN;Toothbrush  -DV        SLP Rec. for Method of Medication Administration as tolerated  -DV        Monitor for Signs of Aspiration notify SLP if any concerns  -DV        Anticipated Discharge Disposition (SLP) No further SLP services warranted  -DV                  User Key  (r) = Recorded By, (t) = Taken By, (c) = Cosigned By      Initials Name Effective Dates    DV Luz Fregoso MS CCC-SLP 06/16/21 -                     EDUCATION  The patient has been educated in the following areas:   Dysphagia (Swallowing Impairment).                Time Calculation:    Time Calculation- SLP       Row Name 05/24/24 5475             Time Calculation- SLP    SLP Start Time 1415  -DV       SLP Received On 05/24/24  -DV         Untimed Charges    SLP Eval/Re-eval  ST Eval Oral Pharyng Swallow - 13765  -DV      60672-UL Eval Oral Pharyng Swallow Minutes 25  -DV         Total Minutes    Untimed Charges Total Minutes 25  -DV       Total Minutes 25  -DV                User Key  (r) = Recorded By, (t) = Taken By, (c) = Cosigned By      Initials Name Provider Type    Luz Kendall MS CCC-SLP Speech and Language Pathologist                    Therapy Charges for Today       Code Description Service Date Service Provider Modifiers Qty    [id number]  ST EVAL ORAL PHARYNG SWALLOW 2 5/24/2024 Luz Fregoso MS CCC-SLP GN 1                 Luz Fregoso MS CCC-PEDRO  5/24/2024

## 2024-05-24 NOTE — BRIEF OP NOTE
CV CAROTID STENT  Progress Note    Kim Ramsay  5/24/2024    Pre-op Diagnosis:   Carotid stenosis, asymptomatic, left [I65.22]       Post-Op Diagnosis Codes:     * Carotid stenosis, asymptomatic, left [I65.22]    Procedure/CPT® Codes:        Procedure(s):  Left Carotid Stent              Surgeon(s):  Sanjay Sneed MD    Anesthesia: Sedation    Staff:   Scrub Person: Tonia Obrien  Documenter: Geno Woods RN  Invasive Nurse: Sonia Cagle RN         Estimated Blood Loss: minimal    Urine Voided: * No values recorded between 5/24/2024  9:35 AM and 5/24/2024 10:46 AM *    Specimens:                None          Drains: * No LDAs found *    Findings: There is a high-grade, greater than 80%) stenosis involving the left ICA origin.  This responded well to angioplasty/stent placement (with EPD), restoring a near normal caliber lumen to the left carotid vasculature are normal (TICI 3) flow to the left cerebral hemisphere.    There is advanced plaque formation at the right carotid bifurcation, but without hemodynamically significant (less than 50%) stenosis.  The vertebrobasilar system is widely patent, without stenosis.        Complications: None apparent.          Sanjay Sneed MD     Date: 5/24/2024  Time: 10:49 EDT

## 2024-05-24 NOTE — Clinical Note
A 5 fr sheath was successfully inserted using micropuncture technique into the right femoral artery.

## 2024-05-24 NOTE — PROGRESS NOTES
Intensive Care Follow-up     Hospital:  LOS: 0 days   Ms. Kim Ramsay, 89 y.o. female is followed for:   Carotid stenosis, asymptomatic, left          Subjective   Interval History:  Encountered patient upon arrival to the ICU. Is experiencing some tingling to left hand and mild left facial droop. BP systolic in 70s. Concerned symptoms 2* hypoperfusion & Neurosurgery notified. IVF bolus given per Neurosurgery with some improvement in BP and symptoms.     Denies headache, visual changes, nausea, vomiting.      The patient's past medical, surgical and social history were reviewed and updated in Epic as appropriate.       Objective     Infusions:  niCARdipine, 5-15 mg/hr  phenylephrine, 0.5-3 mcg/kg/min  sodium chloride, 50 mL/hr, Last Rate: 50 mL/hr (05/24/24 0900)      Medications:  cetirizine, 10 mg, Oral, Daily  clopidogrel, 75 mg, Oral, Daily  furosemide, 20 mg, Oral, Daily  hydrALAZINE, 25 mg, Oral, BID  metoprolol succinate XL, 25 mg, Oral, Daily  potassium chloride, 10 mEq, Oral, Daily  pravastatin, 80 mg, Oral, Nightly  propafenone, 150 mg, Oral, TID  [START ON 5/25/2024] rivaroxaban, 20 mg, Oral, Daily  sodium chloride, 10 mL, Intravenous, Q12H  sodium chloride, 10 mL, Intravenous, Q12H  [START ON 5/25/2024] valsartan, 320 mg, Oral, Daily      I reviewed the patient's medications.    Vital Sign Min/Max for last 24 hours  Temp  Min: 96.6 °F (35.9 °C)  Max: 97.9 °F (36.6 °C)   BP  Min: 81/45  Max: 171/108   Pulse  Min: 64  Max: 101   Resp  Min: 16  Max: 18   SpO2  Min: 86 %  Max: 98 %   No data recorded       Input/Output for last 24 hour shift  No intake/output data recorded.        Physical Exam:  GENERAL: Patient lying in bed and conversant. No acute distress.   HEENT: Normocephalic and atraumatic. Trachea midline. PER. EOM WNL. Mild, intermittent, left facial droop noted.   LUNGS: Chest rise of normal depth and symmetric. Lungs clear to auscultation bilaterally. No wheezes, rhonchi, or rales.    HEART: S1,S2 detected. Regular rate and rhythm. No rub, murmur, or gallop.   ABDOMEN: Soft, round, nondistended, and nontender. Bowel sounds present.   EXTREMITIES: No clubbing, edema, or cyanosis. Peripheral pulses present. Skin warm and dry. R fem site C/D/I.    NEURO/PSYCH: Alert and oriented. Follows commands. Moves all extremities. No focal deficits.      Results from last 7 days   Lab Units 05/24/24  0843   WBC 10*3/mm3 5.01   HEMOGLOBIN g/dL 12.8   PLATELETS 10*3/mm3 251     Results from last 7 days   Lab Units 05/24/24  0843   SODIUM mmol/L 135*   POTASSIUM mmol/L 4.1   CO2 mmol/L 29.0   BUN mg/dL 11   CREATININE mg/dL 0.96   GLUCOSE mg/dL 98     Estimated Creatinine Clearance: 45.6 mL/min (by C-G formula based on SCr of 0.96 mg/dL).          I reviewed the patient's new clinical results.  I reviewed the patient's new imaging results/reports including actual images and agree with reports.     Imaging Results (Last 24 Hours)       ** No results found for the last 24 hours. **            Assessment & Plan   Impression      Carotid stenosis, asymptomatic, left    A-fib    Hyperlipidemia    HTN (hypertension)    Prediabetes    Kim Ramsay is an 89 year-old female with carotid stenosis, Afib on Xarelto, previous DVT, hyperlipidemia, prediabetes, and HTN that presents to [facility] ICU postoperatively from scheduled left carotid stent placement with Dr. Sneed today. Patient has been experiencing weekly episodes of dizziness and nausea/vomiting. CT imaging found high-grade stenosis. She was referred to CT Surgery, who believed she was too high risk for surgical intervention, and subsequently saw Dr. Sneed for consideration of intervention.      Plan      Admit to ICU  Postop orders per surgery  Phenylephrine for BP support if needed  Neurovascular checks per protocol  Plavix / statin  Ensure adequate pain control  Mobilize patient per protocol  Aggressive pulmonary toilet  Restart home medications as appropriate /  Rate/rhythm control for Afib  Xarelto restarted per primary service  AM labs     Time spent: 41 minutes  Plan of care and goals reviewed with multidisciplinary/antibiotic stewardship team during rounds.   I discussed the patient's findings and my recommendations with patient, family, and nursing staff     Berny Mayers, MSN, APRN, [facility]  Pulmonary and Critical Care Medicine

## 2024-05-24 NOTE — PLAN OF CARE
Notified by nurse that patient's systolic BP was in the 70s and that she had noticed a slight facial droop on the left. Patient also reported some numbness and tingling in the left hand.  Mentation normal. Patient was given a NS bolus and her pressures improved, now in the 90s.  She also reports improvement of the numbness/tingling in her hand, as it is now only in the thumb and continues to improve.  The left facial droop is very minimal and I had difficulty appreciating this.  She has full strength in the upper extremities bilaterally, speech is clear.  Nonfocal neurologic exam.  If her BP continues to drop and symptoms persist, we will begin jerry.

## 2024-05-24 NOTE — PLAN OF CARE
Problem: Adult Inpatient Plan of Care  Goal: Plan of Care Review  Outcome: Ongoing, Progressing  Flowsheets (Taken 5/24/2024 2181)  Plan of Care Reviewed With: patient   Goal Outcome Evaluation:  Plan of Care Reviewed With: patient         SLP evaluation completed. Will sign-off. Please see note for further details and recommendations.           Anticipated Discharge Disposition (SLP): No further SLP services warranted          SLP Swallowing Diagnosis: swallow WFL/no suspected pharyngeal impairment (05/24/24 2174)

## 2024-05-25 VITALS
SYSTOLIC BLOOD PRESSURE: 108 MMHG | BODY MASS INDEX: 32.78 KG/M2 | TEMPERATURE: 97.7 F | HEIGHT: 66 IN | OXYGEN SATURATION: 95 % | WEIGHT: 204 LBS | DIASTOLIC BLOOD PRESSURE: 71 MMHG | HEART RATE: 71 BPM | RESPIRATION RATE: 16 BRPM

## 2024-05-25 LAB
ANION GAP SERPL CALCULATED.3IONS-SCNC: 7 MMOL/L (ref 5–15)
BASOPHILS # BLD AUTO: 0.01 10*3/MM3 (ref 0–0.2)
BASOPHILS NFR BLD AUTO: 0.2 % (ref 0–1.5)
BUN SERPL-MCNC: 11 MG/DL (ref 8–23)
BUN/CREAT SERPL: 12.9 (ref 7–25)
CALCIUM SPEC-SCNC: 8.4 MG/DL (ref 8.6–10.5)
CHLORIDE SERPL-SCNC: 106 MMOL/L (ref 98–107)
CO2 SERPL-SCNC: 26 MMOL/L (ref 22–29)
CREAT SERPL-MCNC: 0.85 MG/DL (ref 0.57–1)
DEPRECATED RDW RBC AUTO: 51 FL (ref 37–54)
EGFRCR SERPLBLD CKD-EPI 2021: 65.6 ML/MIN/1.73
EOSINOPHIL # BLD AUTO: 0.04 10*3/MM3 (ref 0–0.4)
EOSINOPHIL NFR BLD AUTO: 0.9 % (ref 0.3–6.2)
ERYTHROCYTE [DISTWIDTH] IN BLOOD BY AUTOMATED COUNT: 14.5 % (ref 12.3–15.4)
GLUCOSE SERPL-MCNC: 83 MG/DL (ref 65–99)
HCT VFR BLD AUTO: 34 % (ref 34–46.6)
HGB BLD-MCNC: 10.9 G/DL (ref 12–15.9)
IMM GRANULOCYTES # BLD AUTO: 0.01 10*3/MM3 (ref 0–0.05)
IMM GRANULOCYTES NFR BLD AUTO: 0.2 % (ref 0–0.5)
LYMPHOCYTES # BLD AUTO: 2.17 10*3/MM3 (ref 0.7–3.1)
LYMPHOCYTES NFR BLD AUTO: 49.7 % (ref 19.6–45.3)
MAGNESIUM SERPL-MCNC: 1.8 MG/DL (ref 1.6–2.4)
MCH RBC QN AUTO: 30.9 PG (ref 26.6–33)
MCHC RBC AUTO-ENTMCNC: 32.1 G/DL (ref 31.5–35.7)
MCV RBC AUTO: 96.3 FL (ref 79–97)
MONOCYTES # BLD AUTO: 0.28 10*3/MM3 (ref 0.1–0.9)
MONOCYTES NFR BLD AUTO: 6.4 % (ref 5–12)
NEUTROPHILS NFR BLD AUTO: 1.86 10*3/MM3 (ref 1.7–7)
NEUTROPHILS NFR BLD AUTO: 42.6 % (ref 42.7–76)
NRBC BLD AUTO-RTO: 0 /100 WBC (ref 0–0.2)
PHOSPHATE SERPL-MCNC: 3.7 MG/DL (ref 2.5–4.5)
PLATELET # BLD AUTO: 184 10*3/MM3 (ref 140–450)
PMV BLD AUTO: 9.3 FL (ref 6–12)
POTASSIUM SERPL-SCNC: 4.3 MMOL/L (ref 3.5–5.2)
RBC # BLD AUTO: 3.53 10*6/MM3 (ref 3.77–5.28)
SODIUM SERPL-SCNC: 139 MMOL/L (ref 136–145)
WBC NRBC COR # BLD AUTO: 4.37 10*3/MM3 (ref 3.4–10.8)

## 2024-05-25 PROCEDURE — 83735 ASSAY OF MAGNESIUM: CPT

## 2024-05-25 PROCEDURE — 85025 COMPLETE CBC W/AUTO DIFF WBC: CPT

## 2024-05-25 PROCEDURE — 80048 BASIC METABOLIC PNL TOTAL CA: CPT

## 2024-05-25 PROCEDURE — 84100 ASSAY OF PHOSPHORUS: CPT

## 2024-05-25 PROCEDURE — 25810000003 SODIUM CHLORIDE 0.9 % SOLUTION: Performed by: RADIOLOGY

## 2024-05-25 RX ADMIN — CETIRIZINE HYDROCHLORIDE 10 MG: 10 TABLET, FILM COATED ORAL at 08:29

## 2024-05-25 RX ADMIN — RIVAROXABAN 20 MG: 20 TABLET, FILM COATED ORAL at 08:29

## 2024-05-25 RX ADMIN — SODIUM CHLORIDE 50 ML/HR: 9 INJECTION, SOLUTION INTRAVENOUS at 01:50

## 2024-05-25 RX ADMIN — Medication 10 ML: at 08:31

## 2024-05-25 RX ADMIN — Medication 10 ML: at 08:30

## 2024-05-25 RX ADMIN — CLOPIDOGREL BISULFATE 75 MG: 75 TABLET ORAL at 08:29

## 2024-05-25 RX ADMIN — PROPAFENONE HYDROCHLORIDE 150 MG: 150 TABLET, FILM COATED ORAL at 05:41

## 2024-05-25 NOTE — DISCHARGE SUMMARY
"PATIENT:  ROSA CORTEZ  YOB: 1934  VISIT ID:  [id number]  PRIMARY CARE:  Geno Cornell DO  ADMITTING PHYSICIAN:  Sanjay Sneed MD    DATE OF ADMISSION:  5/24/2024  DATE OF DISCHARGE:  05/25/24      ADMITTING DIAGNOSIS:  Asymptomatic, high-grade bilateral carotid stenoses    DISCHARGE DIAGNOSIS:  1.  Asymptomatic, high-grade left carotid stent, status post stent placement  2.  Asymptomatic right carotid stenosis      PROCEDURES:  1.  Diagnostic carotid/cerebral angiography.  2.  Left carotid angioplasty/stent placement, with distal embolic protection device (EPD)  3.  Carotid duplex    BRIEF HOSPITAL COURSE:  Ms. Cortez is a 89 y.o. female known to the neurointerventional service, having been previously seen in consultation regarding high-grade bilateral carotid stenoses.  Ms. Cortez's carotid stenosis was discovered on CT angiogram for evaluation of recurrent episodes of dizziness without nausea/vomiting.  She does have comorbidities (atrial fibrillation, coronary artery disease), and combined with her age, she was deemed a \"high surgical risk\" for endarterectomy.  Treatment options of conservative management versus carotid angioplasty/stent placement were discussed extensively with patient/family, and after lengthy deliberation, Ms. Cortez elected to pursue carotid angiography and intervention.    She was admitted on 5/24/2024, and diagnostic angiography confirmed the presence of a high-grade (greater than 80%) left internal carotid artery stenosis.  The left cerebral hemisphere did appear relatively \"isolated\" from collateral flow.  There was advanced plaque at the right carotid bifurcation, but without hemodynamically significant (less than 50%) stenosis.  The vertebral arteries and vertebrobasilar system are widely patent, without evidence of vertebrobasilar insufficiency.    Her LEFT carotid stenosis was treated with angioplasty/stent placement, restoring a near " normal caliber lumen and normal (TICI 3) flow to the left cerebral hemisphere.  Other than having a brief episode of LEFT upper extremity and numbness/tingling (not referable to left carotid vasculature), she made an uneventful recovery in the neuro ICU and was discharged home at her neurologic baseline on 5/25/2024.  At the time of discharge, her right groin access site was soft and dry, without hematoma.  Postoperative a carotid duplex demonstrated widely patent left carotid carotid stent, as well as confirming the 50% (or less) stenosis of the right carotid vasculature.    Ms. Ramsay did experience what I would consider a mild baroreceptor response to the carotid stent placement, and thus we will hold her metoprolol and hydralazine for a couple of days until her systolic BP is consistently greater than 130.      Discharge medications, specifically instructions for restarting BP meds, discharge activities, and signs/symptoms of stroke were provided to Ms. Ramsay and family, and they expressed an understanding.  We are going to continue her on her Xarelto and Plavix regimen for the next 3 weeks or so, at which point we will likely transition to Xarelto/aspirin.  Ms. Ramsay will follow-up in the neurointerventional clinic in 3 weeks time.      DISCHARGE MEDICATIONS:     Discharge Medications        Continue These Medications        Instructions Start Date   clopidogrel 75 MG tablet  Commonly known as: Plavix   75 mg, Oral, Daily, Start taking 5 days before procedure      furosemide 20 MG tablet  Commonly known as: LASIX   20 mg, Oral, Daily      hydrALAZINE 25 MG tablet  Commonly known as: APRESOLINE   25 mg, Oral, 2 Times Daily      loratadine 10 MG tablet  Commonly known as: CLARITIN   1 tablet, Oral, Daily      meclizine 12.5 MG tablet  Commonly known as: ANTIVERT   12.5 mg, Oral, 3 Times Daily PRN      metoprolol succinate XL 25 MG 24 hr tablet  Commonly known as: TOPROL-XL   25 mg, Oral, Daily       multivitamin tablet tablet   Every 24 Hours      ondansetron ODT 4 MG disintegrating tablet  Commonly known as: ZOFRAN-ODT   4 mg      potassium chloride 10 MEQ CR capsule  Commonly known as: MICRO-K   10 mEq, Oral, Daily      pravastatin 80 MG tablet  Commonly known as: PRAVACHOL   80 mg, Oral, Every Night at Bedtime      propafenone 150 MG tablet  Commonly known as: RYTHMOL   1 tablet, Oral, 3 times daily      Repatha SureClick solution auto-injector SureClick injection  Generic drug: Evolocumab   INJECT 1 ML SUBCUTANEOUSLY EVERY TWO WEEKS      valsartan 320 MG tablet  Commonly known as: DIOVAN   320 mg, Oral, Daily      Xarelto 20 MG tablet  Generic drug: rivaroxaban   20 mg, Oral, Daily             Will hold Metoprolol and Hydralazine until systolic BP greater than 130.    ACTIVITY:  No strenuous activity or heavy lifting for 5-7 days.    DIET:  Cardiac diet    FOLLOW UP:       Follow-up Information       Geno Cornell DO .    Specialty: Family Medicine  Contact information:  [address] [facility] [address] [phone]               Sanjay Sneed MD Follow up in 3 week(s).    Specialty: Neurosurgery  Contact information:  [address]  [facility] [address] [phone]

## 2024-06-17 ENCOUNTER — OFFICE VISIT (OUTPATIENT)
Dept: NEUROSURGERY | Facility: CLINIC | Age: 89
End: 2024-06-17
Payer: MEDICARE

## 2024-06-17 VITALS
BODY MASS INDEX: 32.62 KG/M2 | WEIGHT: 203 LBS | HEART RATE: 98 BPM | SYSTOLIC BLOOD PRESSURE: 152 MMHG | TEMPERATURE: 97.3 F | HEIGHT: 66 IN | OXYGEN SATURATION: 97 % | DIASTOLIC BLOOD PRESSURE: 98 MMHG

## 2024-06-17 DIAGNOSIS — I65.22 CAROTID STENOSIS, ASYMPTOMATIC, LEFT: Primary | ICD-10-CM

## 2024-06-17 DIAGNOSIS — I65.23 CAROTID STENOSIS, ASYMPTOMATIC, BILATERAL: ICD-10-CM

## 2024-06-17 PROCEDURE — 1160F RVW MEDS BY RX/DR IN RCRD: CPT

## 2024-06-17 PROCEDURE — 99024 POSTOP FOLLOW-UP VISIT: CPT

## 2024-06-17 PROCEDURE — 1159F MED LIST DOCD IN RCRD: CPT

## 2024-06-17 RX ORDER — SIMETHICONE 125 MG
125 CAPSULE ORAL
COMMUNITY
Start: 2024-03-27

## 2024-06-17 NOTE — PROGRESS NOTES
"NAME: ROSA CORTEZ   DOS: 2024  : 10/28/1934  PCP: Geno Cornell DO    Chief Complaint:    Chief Complaint   Patient presents with    Post-op       History of Present Illness:  89 y.o. female s/p left carotid stent performed by Dr. Sneed on 2024.  Patient has a history of high-grade bilateral carotid stenoses.  She was having recurrent episodes (nearly weekly) of dizziness with accompanying nausea/vomiting. As part of her workup/evaluation she underwent CT angiography which suggests high-grade bilateral carotid stenoses.  She was subsequently seen in the neurointerventional clinic after being deemed a \"high surgical risk\" for endarterectomy due to her age and comorbidities (A-fib, coronary artery disease).  Carotid angiography confirmed a high-grade left internal carotid artery stenosis that was subsequently stented, and plaque at the right carotid bifurcation, without hemodynamically significant (less than 50%) stenosis. She was previously on a a chronic Xarelto anticoagulation regimen, and was started on a Plavix/Xarelto regimen for the carotid stent, and she is tolerating this well.  She presents to the neurointerventional clinic today for routine follow-up.  She reports her dizziness has improved significantly. Denies any stroke/TIA-like symptoms.    Past Medical History:  Past Medical History:   Diagnosis Date    Arthritis     Atrial fibrillation     Deep vein thrombosis     Hyperlipidemia     Hypertension     Myocardial infarction     Pre-diabetes     UTI (urinary tract infection)        Past Surgical History:  Past Surgical History:   Procedure Laterality Date    CAROTID STENT Left 2024    Procedure: Carotid Stent;  Surgeon: Sanjay Sneed MD;  Location: [facility] INVASIVE LOCATION;  Service: Interventional Radiology;  Laterality: Left;    CATARACT EXTRACTION Bilateral     CHOLECYSTECTOMY      HYSTERECTOMY         Review of Systems:        Review of Systems "   Constitutional:  Negative for activity change, appetite change, chills, diaphoresis, fatigue, fever and unexpected weight change.   HENT:  Negative for congestion, dental problem, drooling, ear discharge, ear pain, facial swelling, hearing loss, mouth sores, nosebleeds, postnasal drip, rhinorrhea, sinus pressure, sinus pain, sneezing, sore throat, tinnitus, trouble swallowing and voice change.    Eyes:  Negative for photophobia, pain, discharge, redness, itching and visual disturbance.   Respiratory:  Positive for shortness of breath. Negative for apnea, cough, choking, chest tightness, wheezing and stridor.    Cardiovascular:  Positive for palpitations. Negative for chest pain and leg swelling.   Gastrointestinal:  Negative for abdominal distention, abdominal pain, anal bleeding, blood in stool, constipation, diarrhea, nausea, rectal pain and vomiting.   Endocrine: Negative for cold intolerance, heat intolerance, polydipsia, polyphagia and polyuria.   Genitourinary:  Negative for decreased urine volume, difficulty urinating, dyspareunia, dysuria, enuresis, flank pain, frequency, genital sores, hematuria, menstrual problem, pelvic pain, urgency, vaginal bleeding, vaginal discharge and vaginal pain.   Musculoskeletal:  Negative for arthralgias, back pain, gait problem, joint swelling, myalgias, neck pain and neck stiffness.   Skin:  Negative for color change, pallor, rash and wound.   Allergic/Immunologic: Negative for environmental allergies, food allergies and immunocompromised state.   Neurological:  Positive for dizziness and weakness. Negative for tremors, seizures, syncope, facial asymmetry, speech difficulty, light-headedness, numbness and headaches.   Hematological:  Negative for adenopathy. Bruises/bleeds easily.   Psychiatric/Behavioral:  Negative for agitation, behavioral problems, confusion, decreased concentration, dysphoric mood, hallucinations, self-injury, sleep disturbance and suicidal ideas. The  patient is not nervous/anxious and is not hyperactive.         Medications    Current Outpatient Medications:     clopidogrel (Plavix) 75 MG tablet, Take 1 tablet by mouth Daily. Start taking 5 days before procedure, Disp: 30 tablet, Rfl: 3    furosemide (LASIX) 20 MG tablet, Take 1 tablet by mouth Daily., Disp: , Rfl:     hydrALAZINE (APRESOLINE) 25 MG tablet, Take 1 tablet by mouth 2 (Two) Times a Day., Disp: , Rfl:     loratadine (CLARITIN) 10 MG tablet, Take 1 tablet by mouth Daily., Disp: , Rfl:     meclizine (ANTIVERT) 12.5 MG tablet, Take 1 tablet by mouth 3 (Three) Times a Day As Needed for Dizziness., Disp: , Rfl:     metoprolol succinate XL (TOPROL-XL) 25 MG 24 hr tablet, Take 1 tablet by mouth Daily., Disp: , Rfl:     multivitamin (THERAGRAN) tablet tablet, Daily., Disp: , Rfl:     ondansetron ODT (ZOFRAN-ODT) 4 MG disintegrating tablet, 1 tablet., Disp: , Rfl:     potassium chloride (MICRO-K) 10 MEQ CR capsule, Take 1 capsule by mouth Daily., Disp: , Rfl:     pravastatin (PRAVACHOL) 80 MG tablet, Take 1 tablet by mouth every night at bedtime., Disp: , Rfl:     propafenone (RYTHMOL) 150 MG tablet, Take 1 tablet by mouth 3 times a day., Disp: , Rfl:     Repatha SureClick solution auto-injector SureClick injection, INJECT 1 ML SUBCUTANEOUSLY EVERY TWO WEEKS, Disp: , Rfl:     simethicone (MYLICON,GAS-X) 125 MG capsule capsule, 1 capsule., Disp: , Rfl:     valsartan (DIOVAN) 320 MG tablet, Take 1 tablet by mouth Daily., Disp: , Rfl:     Xarelto 20 MG tablet, Take 1 tablet by mouth Daily., Disp: , Rfl:     Allergies:  Allergies   Allergen Reactions    Ciprofloxacin Other (See Comments)     Tightness in chest      Ranitidine Unknown - Low Severity    Sulfamethoxazole-Trimethoprim GI Intolerance    Trimethoprim Rash       Social Hx:  Social History     Tobacco Use    Smoking status: Never    Smokeless tobacco: Never   Vaping Use    Vaping status: Never Used   Substance Use Topics    Alcohol use: Never    Drug  use: Never       Family Hx:  Family History   Problem Relation Age of Onset    Heart attack Mother     Stroke Mother     Breast cancer Mother     COPD Father     Aneurysm Brother     Heart attack Brother        Review of Imaging: No new imaging    Physical Examination:  Vitals:    06/17/24 1248   BP: 152/98   Pulse:    Temp:    SpO2:         General Appearance:   Well developed, well nourished, well groomed, alert, and cooperative.  Cardiovascular: Regular rate and rhythm. No carotid bruits      Neurological examination:  Alert and oriented x 3.  Nonfocal neurologic exam.  Speech is clear.  No facial droop or pronator drift.  I do not appreciate any gross visual field deficits.  EOMs intact bilaterally.  Strength is symmetric in upper and lower extremities. Ambulates with assistance of a cane.      Diagnoses/Plan:    Ms. Ramsay is a 89 y.o. female known to the neurointerventional service, having been previously seen in consultation regarding high-grade bilateral carotid stenoses.  She is s/p left carotid stent performed by Dr. Sneed on 5/24/2024.  She has done well following the procedure and reports that her dizziness has significantly improved.  She is currently on a Xarelto/Plavix regimen and tolerating this well.  We were considering discontinuing the Plavix and restarting aspirin, but she reports her cardiologist wanting her to remain on Plavix.  She is tolerating the Xarelto/Plavix regimen well, therefore we will continue this regimen.  Will plan to have her follow-up in 6 months with carotid duplex to ensure stability and exclude any recurrent/progression of disease that might necessitate further treatment/intervention.  In the interim Ms. Ramsay will contact our office/911 if she experiences any new stroke/TIA-like symptoms.    Copied text and portions of the note have been reviewed and are accurate as of 6/17/2024.

## 2024-09-16 DIAGNOSIS — I65.22 CAROTID STENOSIS, ASYMPTOMATIC, LEFT: ICD-10-CM

## 2024-09-16 RX ORDER — CLOPIDOGREL BISULFATE 75 MG/1
TABLET ORAL
Qty: 90 TABLET | Refills: 0 | Status: SHIPPED | OUTPATIENT
Start: 2024-09-16

## 2024-09-23 ENCOUNTER — TELEPHONE (OUTPATIENT)
Dept: NEUROSURGERY | Facility: CLINIC | Age: 89
End: 2024-09-23
Payer: MEDICARE

## 2024-12-04 ENCOUNTER — OFFICE VISIT (OUTPATIENT)
Dept: NEUROSURGERY | Facility: CLINIC | Age: 89
End: 2024-12-04
Payer: MEDICARE

## 2024-12-04 ENCOUNTER — HOSPITAL ENCOUNTER (OUTPATIENT)
Dept: CARDIOLOGY | Facility: HOSPITAL | Age: 89
Discharge: HOME | End: 2024-12-04
Payer: MEDICARE

## 2024-12-04 VITALS
RESPIRATION RATE: 18 BRPM | SYSTOLIC BLOOD PRESSURE: 160 MMHG | BODY MASS INDEX: 31.34 KG/M2 | HEIGHT: 66 IN | DIASTOLIC BLOOD PRESSURE: 80 MMHG | WEIGHT: 195 LBS | TEMPERATURE: 97.4 F

## 2024-12-04 VITALS — HEIGHT: 66 IN | BODY MASS INDEX: 32.62 KG/M2 | WEIGHT: 203 LBS

## 2024-12-04 DIAGNOSIS — I65.22 CAROTID STENOSIS, ASYMPTOMATIC, LEFT: ICD-10-CM

## 2024-12-04 DIAGNOSIS — I65.22 CAROTID STENOSIS, ASYMPTOMATIC, LEFT: Primary | ICD-10-CM

## 2024-12-04 LAB
BH CV LEFT CCA HIDDEN LRR: 1 CM/S
BH CV MID LEFT ICA HIDDEN LRR: 1 CM
BH CV VAS CAROTID LEFT DISTAL STENT EDV: 13 CM/S
BH CV VAS CAROTID LEFT DISTAL STENT: 55 CM/S
BH CV VAS CAROTID LEFT DISTAL TO STENT EDV: 16 CM/S
BH CV VAS CAROTID LEFT DISTAL TO STENT: 81 CM/S
BH CV VAS CAROTID LEFT MID STENT EDV: 14 CM/S
BH CV VAS CAROTID LEFT MID STENT: 65 CM/S
BH CV VAS CAROTID LEFT PROXIMAL STENT EDV: 11 CM/S
BH CV VAS CAROTID LEFT PROXIMAL STENT: 48 CM/S
BH CV VAS CAROTID LEFT PROXIMAL TO STENT: 71 CM/S
BH CV VAS CAROTID LEFT STENT NATIVE VESSEL PROXIMAL ED: 15 CM/S
BH CV XLRA MEAS LEFT DIST CCA EDV: 10.9 CM/SEC
BH CV XLRA MEAS LEFT DIST CCA PSV: 48.4 CM/SEC
BH CV XLRA MEAS LEFT DIST ICA EDV: 14 CM/SEC
BH CV XLRA MEAS LEFT DIST ICA PSV: 46.6 CM/SEC
BH CV XLRA MEAS LEFT ICA/CCA RATIO: 0.86
BH CV XLRA MEAS LEFT MID CCA EDV: 17.4 CM/SEC
BH CV XLRA MEAS LEFT MID CCA PSV: 76.4 CM/SEC
BH CV XLRA MEAS LEFT MID ICA EDV: 12.7 CM/SEC
BH CV XLRA MEAS LEFT MID ICA PSV: 55 CM/SEC
BH CV XLRA MEAS LEFT PROX CCA EDV: 17.4 CM/SEC
BH CV XLRA MEAS LEFT PROX CCA PSV: 96.2 CM/SEC
BH CV XLRA MEAS LEFT PROX ECA EDV: 13 CM/SEC
BH CV XLRA MEAS LEFT PROX ECA PSV: 145.9 CM/SEC
BH CV XLRA MEAS LEFT PROX ICA EDV: 13.7 CM/SEC
BH CV XLRA MEAS LEFT PROX ICA PSV: 64.9 CM/SEC
BH CV XLRA MEAS LEFT PROX SCLA PSV: 96 CM/SEC
BH CV XLRA MEAS LEFT VERTEBRAL A EDV: 10.2 CM/SEC
BH CV XLRA MEAS LEFT VERTEBRAL A PSV: 46.3 CM/SEC
BH CV XLRA MEAS RIGHT DIST CCA EDV: 13.6 CM/SEC
BH CV XLRA MEAS RIGHT DIST CCA PSV: 73.4 CM/SEC
BH CV XLRA MEAS RIGHT DIST ICA EDV: 20.5 CM/SEC
BH CV XLRA MEAS RIGHT DIST ICA PSV: 85.7 CM/SEC
BH CV XLRA MEAS RIGHT ICA/CCA RATIO: 1.22
BH CV XLRA MEAS RIGHT MID CCA EDV: 13.8 CM/SEC
BH CV XLRA MEAS RIGHT MID CCA PSV: 91.6 CM/SEC
BH CV XLRA MEAS RIGHT MID ICA EDV: 21.1 CM/SEC
BH CV XLRA MEAS RIGHT MID ICA PSV: 96.9 CM/SEC
BH CV XLRA MEAS RIGHT PROX CCA EDV: 6.1 CM/SEC
BH CV XLRA MEAS RIGHT PROX CCA PSV: 107.1 CM/SEC
BH CV XLRA MEAS RIGHT PROX ECA EDV: 11.2 CM/SEC
BH CV XLRA MEAS RIGHT PROX ECA PSV: 80.1 CM/SEC
BH CV XLRA MEAS RIGHT PROX ICA EDV: 23.6 CM/SEC
BH CV XLRA MEAS RIGHT PROX ICA PSV: 111.8 CM/SEC
BH CV XLRA MEAS RIGHT PROX SCLA PSV: 86 CM/SEC
BH CV XLRA MEAS RIGHT VERTEBRAL A EDV: 9.3 CM/SEC
BH CV XLRA MEAS RIGHT VERTEBRAL A PSV: 47.5 CM/SEC
BH CVPROX LEFT ICA HIDDEN LRR: 1 CM

## 2024-12-04 PROCEDURE — 93880 EXTRACRANIAL BILAT STUDY: CPT

## 2024-12-04 RX ORDER — AMIODARONE HYDROCHLORIDE 100 MG/1
100 TABLET ORAL
COMMUNITY
Start: 2024-10-28

## 2024-12-04 NOTE — PROGRESS NOTES
Name: Kim Ramsay    : 10/28/1934     MRN: [id number]     Primary Care Provider: Geno Cornell DO    Chief Complaint  Carotid stenosis      History of Present Illness:  Kim Ramsay is a 89 y.o. female who is well-known to the neurointerventional clinic for carotid stenosis, s/p left carotid angioplasty/stent placement on 2024 with Dr. Sneed.  She was having recurrent episodes (nearly weekly) of dizziness with accompanying nausea/vomiting. As part of her workup/evaluation she underwent noninvasive imaging studies that demonstrated a high-grade bilateral carotid stenoses.  She is on a Plavix/Xarelto regimen and tolerating this well.  She denies any stroke/TIA-like symptoms.  She presents today for routine follow-up.     PMHX  Allergies:  Allergies   Allergen Reactions    Ciprofloxacin Other (See Comments)     Tightness in chest      Ranitidine Unknown - Low Severity    Sulfamethoxazole-Trimethoprim GI Intolerance    Trimethoprim Rash     Medications    Current Outpatient Medications:     amiodarone (PACERONE) 100 MG tablet, 1 tablet., Disp: , Rfl:     clopidogrel (PLAVIX) 75 MG tablet, TAKE 1 TABLET BY MOUTH ONCE DAILY STARTING  5  DAYS  BEFORE  PROCEDURE, Disp: 90 tablet, Rfl: 0    furosemide (LASIX) 20 MG tablet, Take 1 tablet by mouth Daily., Disp: , Rfl:     hydrALAZINE (APRESOLINE) 25 MG tablet, Take 1 tablet by mouth 2 (Two) Times a Day., Disp: , Rfl:     loratadine (CLARITIN) 10 MG tablet, Take 1 tablet by mouth Daily., Disp: , Rfl:     meclizine (ANTIVERT) 12.5 MG tablet, Take 1 tablet by mouth 3 (Three) Times a Day As Needed for Dizziness., Disp: , Rfl:     metoprolol succinate XL (TOPROL-XL) 25 MG 24 hr tablet, Take 1 tablet by mouth Daily., Disp: , Rfl:     multivitamin (THERAGRAN) tablet tablet, Daily., Disp: , Rfl:     ondansetron ODT (ZOFRAN-ODT) 4 MG disintegrating tablet, 1 tablet., Disp: , Rfl:     potassium chloride (MICRO-K) 10 MEQ CR capsule, Take 1 capsule  by mouth Daily., Disp: , Rfl:     pravastatin (PRAVACHOL) 80 MG tablet, Take 1 tablet by mouth every night at bedtime., Disp: , Rfl:     propafenone (RYTHMOL) 150 MG tablet, Take 1 tablet by mouth 3 times a day., Disp: , Rfl:     Repatha SureClick solution auto-injector SureClick injection, INJECT 1 ML SUBCUTANEOUSLY EVERY TWO WEEKS, Disp: , Rfl:     simethicone (MYLICON,GAS-X) 125 MG capsule capsule, 1 capsule., Disp: , Rfl:     valsartan (DIOVAN) 320 MG tablet, Take 1 tablet by mouth Daily., Disp: , Rfl:     Xarelto 20 MG tablet, Take 1 tablet by mouth Daily., Disp: , Rfl:   Past Medical History:  Past Medical History:   Diagnosis Date    Arthritis     Atrial fibrillation     Deep vein thrombosis     Hyperlipidemia     Hypertension     Myocardial infarction     Pre-diabetes     UTI (urinary tract infection)      Past Surgical History:  Past Surgical History:   Procedure Laterality Date    CAROTID STENT Left 5/24/2024    Procedure: Carotid Stent;  Surgeon: Sanjay Sneed MD;  Location: [facility] INVASIVE LOCATION;  Service: Interventional Radiology;  Laterality: Left;    CATARACT EXTRACTION Bilateral     CHOLECYSTECTOMY      HYSTERECTOMY       Social Hx:  Social History     Tobacco Use    Smoking status: Never     Passive exposure: Never    Smokeless tobacco: Never   Vaping Use    Vaping status: Never Used   Substance Use Topics    Alcohol use: Never    Drug use: Never     Family Hx:  Family History   Problem Relation Age of Onset    Heart attack Mother     Stroke Mother     Breast cancer Mother     COPD Father     Aneurysm Brother     Heart attack Brother      Review of Systems:        Review of Systems   Constitutional:  Negative for activity change, appetite change, chills, diaphoresis, fatigue, fever and unexpected weight change.   HENT:  Negative for congestion, dental problem, drooling, ear discharge, ear pain, facial swelling, hearing loss, mouth sores, nosebleeds, postnasal drip, rhinorrhea, sinus  pressure, sneezing, sore throat, tinnitus, trouble swallowing and voice change.    Eyes:  Negative for photophobia, pain, discharge, redness, itching and visual disturbance.   Respiratory:  Negative for apnea, cough, choking, chest tightness, shortness of breath, wheezing and stridor.    Cardiovascular:  Negative for chest pain, palpitations and leg swelling.   Gastrointestinal:  Negative for abdominal distention, abdominal pain, anal bleeding, blood in stool, constipation, diarrhea, nausea, rectal pain and vomiting.   Endocrine: Negative for cold intolerance, heat intolerance, polydipsia, polyphagia and polyuria.   Genitourinary:  Negative for decreased urine volume, difficulty urinating, dysuria, enuresis, flank pain, frequency, genital sores, hematuria and urgency.   Musculoskeletal:  Negative for arthralgias, back pain, gait problem, joint swelling, myalgias, neck pain and neck stiffness.   Skin:  Negative for color change, pallor, rash and wound.   Allergic/Immunologic: Negative for environmental allergies, food allergies and immunocompromised state.   Neurological:  Negative for dizziness, tremors, seizures, syncope, facial asymmetry, speech difficulty, weakness, light-headedness, numbness and headaches.   Hematological:  Negative for adenopathy. Does not bruise/bleed easily.   Psychiatric/Behavioral:  Negative for agitation, behavioral problems, confusion, decreased concentration, dysphoric mood, hallucinations, self-injury, sleep disturbance and suicidal ideas. The patient is not nervous/anxious and is not hyperactive.    All other systems reviewed and are negative.       Review of  Imaging: Carotid duplex dated 12/4/2024 from [facility] was reviewed along with its corresponding radiologic report.  Comparison is made to carotid duplex dated 5/24/2024.  There is plaque present in the right ICA, but without hemodynamically significant stenosis.  Peak velocities in the right carotid vasculature  are 112/24 cm/s, with an ICA/CCA ratio of 1.2 (was 136/24, ratio 0.9).  There is a wide open, patent stent in the left ICA.  Peak velocities in the left carotid vasculature are 65/14 cm/s, with an ICA/CCA ratio of 0.9 (was 100/17 cm/s, ratio 1.6).    Vital Signs:   Vitals:    12/04/24 1131   BP: 160/80   Resp: 18   Temp: 97.4 °F (36.3 °C)        Physical Exam  General: Well-developed, well-nourished, in no acute distress.    Cardiovascular: Regular rate and rhythm.  No carotid bruits.    Neuro: Alert and oriented x 3.  Nonfocal neurologic exam.  Speech is clear.  No facial droop or pronator drift.  I do not appreciate any gross visual field deficits.  EOMs intact bilaterally.  Strength is symmetric in upper and lower extremities. Ambulates with assistance of a cane for stability purposes.       Social History    Tobacco Use      Smoking status: Never        Passive exposure: Never      Smokeless tobacco: Never       Tobacco Use: Low Risk  (12/4/2024)    Patient History     Smoking Tobacco Use: Never     Smokeless Tobacco Use: Never     Passive Exposure: Never         STEADI Fall Risk Assessment was completed, and patient is at MODERATE risk for falls. Assessment completed on:12/4/2024        Assessment/Plan    Diagnoses and all orders for this visit:    1. Carotid stenosis, asymptomatic, left (Primary)  -     Duplex Carotid Ultrasound CAR; Future         Kim Ramsay is a 89 y.o. female who is s/p left carotid stent performed by Dr. Sneed on 5/24/2024. She has done well following the procedure and reports that her dizziness has significantly improved. She is currently on a Xarelto/Plavix regimen and tolerating this well, and should continue this.  Will plan to follow-up with Ms. Ramsay in 12 months time with carotid duplex to ensure stability/exclude progression of disease that might necessitate further treatment/intervention.  In the interim, she will contact our office and/or 911 if she experiences  any new stroke/TIA-like symptoms.    Patient encouraged to contact us if she has any changes in her condition or any concerns.    Any copied data from previous notes included in the (1) HPI, (2) PE, (3) MDM and/or Assessment and Plan has been reviewed and accurate as of 12/04/24.    Madalyn Basurto PA-C  12/04/24

## 2024-12-04 NOTE — PROGRESS NOTES
NAME: ROSA CORTEZ   DOS: 2024  : 10/28/1934  PCP: Geno Cornell DO    Chief Complaint:  No chief complaint on file.      History of Present Illness:  89 y.o. female ***    Past Medical History:  Past Medical History:   Diagnosis Date    Arthritis     Atrial fibrillation     Deep vein thrombosis     Hyperlipidemia     Hypertension     Myocardial infarction     Pre-diabetes     UTI (urinary tract infection)        Past Surgical History:  Past Surgical History:   Procedure Laterality Date    CAROTID STENT Left 2024    Procedure: Carotid Stent;  Surgeon: Sanjay Sneed MD;  Location: [facility] INVASIVE LOCATION;  Service: Interventional Radiology;  Laterality: Left;    CATARACT EXTRACTION Bilateral     CHOLECYSTECTOMY      HYSTERECTOMY         Review of Systems:        Review of Systems     Medications    Current Outpatient Medications:     amiodarone (PACERONE) 100 MG tablet, 1 tablet., Disp: , Rfl:     clopidogrel (PLAVIX) 75 MG tablet, TAKE 1 TABLET BY MOUTH ONCE DAILY STARTING  5  DAYS  BEFORE  PROCEDURE, Disp: 90 tablet, Rfl: 0    furosemide (LASIX) 20 MG tablet, Take 1 tablet by mouth Daily., Disp: , Rfl:     hydrALAZINE (APRESOLINE) 25 MG tablet, Take 1 tablet by mouth 2 (Two) Times a Day., Disp: , Rfl:     loratadine (CLARITIN) 10 MG tablet, Take 1 tablet by mouth Daily., Disp: , Rfl:     meclizine (ANTIVERT) 12.5 MG tablet, Take 1 tablet by mouth 3 (Three) Times a Day As Needed for Dizziness., Disp: , Rfl:     metoprolol succinate XL (TOPROL-XL) 25 MG 24 hr tablet, Take 1 tablet by mouth Daily., Disp: , Rfl:     multivitamin (THERAGRAN) tablet tablet, Daily., Disp: , Rfl:     ondansetron ODT (ZOFRAN-ODT) 4 MG disintegrating tablet, 1 tablet., Disp: , Rfl:     potassium chloride (MICRO-K) 10 MEQ CR capsule, Take 1 capsule by mouth Daily., Disp: , Rfl:     pravastatin (PRAVACHOL) 80 MG tablet, Take 1 tablet by mouth every night at bedtime., Disp: , Rfl:     propafenone  (RYTHMOL) 150 MG tablet, Take 1 tablet by mouth 3 times a day., Disp: , Rfl:     Repatha SureClick solution auto-injector SureClick injection, INJECT 1 ML SUBCUTANEOUSLY EVERY TWO WEEKS, Disp: , Rfl:     simethicone (MYLICON,GAS-X) 125 MG capsule capsule, 1 capsule., Disp: , Rfl:     valsartan (DIOVAN) 320 MG tablet, Take 1 tablet by mouth Daily., Disp: , Rfl:     Xarelto 20 MG tablet, Take 1 tablet by mouth Daily., Disp: , Rfl:     Allergies:  Allergies   Allergen Reactions    Ciprofloxacin Other (See Comments)     Tightness in chest      Ranitidine Unknown - Low Severity    Sulfamethoxazole-Trimethoprim GI Intolerance    Trimethoprim Rash       Social Hx:  Social History     Tobacco Use    Smoking status: Never     Passive exposure: Never    Smokeless tobacco: Never   Vaping Use    Vaping status: Never Used   Substance Use Topics    Alcohol use: Never    Drug use: Never       Family Hx:  Family History   Problem Relation Age of Onset    Heart attack Mother     Stroke Mother     Breast cancer Mother     COPD Father     Aneurysm Brother     Heart attack Brother        Review of Imaging:    Physical Examination:  Vitals:    12/04/24 1131   Resp: 18   Temp: 97.4 °F (36.3 °C)        General Appearance:   Well developed, well nourished, well groomed, alert, and cooperative.  Cardiovascular: Regular rate and rhythm. No carotid bruits      Neurological examination:  [unfilled]    Diagnoses/Plan:    Ms. Ramsay is a 89 y.o. female ***

## 2024-12-16 DIAGNOSIS — I65.22 CAROTID STENOSIS, ASYMPTOMATIC, LEFT: ICD-10-CM

## 2024-12-16 RX ORDER — CLOPIDOGREL BISULFATE 75 MG/1
TABLET ORAL
Qty: 90 TABLET | Refills: 0 | Status: SHIPPED | OUTPATIENT
Start: 2024-12-16

## 2025-02-13 DIAGNOSIS — I10 ESSENTIAL HYPERTENSION: Primary | ICD-10-CM

## 2025-02-13 RX ORDER — VALSARTAN 320 MG/1
320 TABLET ORAL DAILY
Qty: 90 TABLET | Refills: 1 | Status: SHIPPED | OUTPATIENT
Start: 2025-02-13

## 2025-02-14 ENCOUNTER — TELEPHONE (OUTPATIENT)
Age: OVER 89
End: 2025-02-14
Payer: MEDICARE

## 2025-03-12 NOTE — PROGRESS NOTES
Cardiology Established Patient Note     Name: Kim Ramsay  :   10/28/1934  PCP: Geno Cornell DO  Date:   2025  Department: [facility] CARD [facility] CARDIOLOGY  [address]  [facility] [address]  Fax [phone]  Phone [phone]    Chief Complaint: I have high BP    Subjective     History of Present Illness  iKm Ramsay is a 89 y.o. female who presents today for 6-month follow-up.  Past cardiac history of CAD, hypertension, hyperlipidemia, paroxysmal A-fib, chronic venous insufficiency.  Currently having no chest pain or SOB, however BP is markedly elevated  in PM up to189 mm/hG.  Cath 2009: Mild diastolic left heart failure, mild CAD, 2 out of 2 patent stents  Abdominal ultrasound 3/19/2024: No AAA  Echo 2023: EF 65%, diastolic dysfunction grade 1, trace MR, trace TR, mild pulmonary hypertension  Venous 2023: Bilateral GSV and left SSV remain occluded following closure    The following data was reviewed by: Galo Rubin MD on 2025:      Current Outpatient Medications:     clopidogrel (PLAVIX) 75 MG tablet, TAKE 1 TABLET BY MOUTH ONCE DAILY STARTING  5  DAYS  BEFORE  PROCEDURE (Patient taking differently: Take 1 tablet by mouth Daily.), Disp: 90 tablet, Rfl: 0    furosemide (LASIX) 20 MG tablet, Take 1 tablet by mouth Daily., Disp: , Rfl:     Lancets (OneTouch Delica Plus [id number]) misc, Inject 1 Application as directed Daily., Disp: , Rfl:     loratadine (CLARITIN) 10 MG tablet, Take 1 tablet by mouth Daily., Disp: , Rfl:     metoprolol succinate XL (TOPROL-XL) 25 MG 24 hr tablet, Take 0.5 tablets by mouth Daily., Disp: , Rfl:     Multiple Vitamins-Minerals (ICAPS AREDS 2 PO), Take 1 tablet by mouth Daily., Disp: , Rfl:     multivitamin (THERAGRAN) tablet tablet, Daily., Disp: , Rfl:     OneTouch Ultra test strip, 1 each by Other route As Needed., Disp: , Rfl:     pravastatin (PRAVACHOL) 80 MG  "tablet, Take 1 tablet by mouth every night at bedtime., Disp: , Rfl:     Repatha SureClick solution auto-injector SureClick injection, INJECT 1 ML SUBCUTANEOUSLY EVERY TWO WEEKS, Disp: , Rfl:     simethicone (MYLICON,GAS-X) 125 MG capsule capsule, 1 capsule., Disp: , Rfl:     valsartan (DIOVAN) 320 MG tablet, Take 1 tablet by mouth Daily., Disp: 90 tablet, Rfl: 1    Xarelto 20 MG tablet, Take 1 tablet by mouth Daily., Disp: , Rfl:     multivitamin (THERAGRAN) tablet tablet, Take 1 tablet by mouth Daily., Disp: , Rfl:     Lab Results   Component Value Date    GLUCOSE 83 05/25/2024    BUN 11 05/25/2024    CREATININE 0.85 05/25/2024    BCR 12.9 05/25/2024    K 4.3 05/25/2024    CO2 26.0 05/25/2024    CALCIUM 8.4 (L) 05/25/2024     No results found for: \"CHOL\", \"CHLPL\", \"TRIG\", \"HDL\", \"LDL\", \"LDLDIRECT\"   Lab Results   Component Value Date    WBC 4.37 05/25/2024    RBC 3.53 (L) 05/25/2024    HGB 10.9 (L) 05/25/2024    HCT 34.0 05/25/2024    MCV 96.3 05/25/2024     05/25/2024          Objective     Vital Signs:  /60 (BP Location: Left arm, Patient Position: Sitting, Cuff Size: Adult)   Pulse 69   Ht 167.6 cm (66\")   Wt 95.3 kg (210 lb)   BMI 33.89 kg/m²   Estimated body mass index is 33.89 kg/m² as calculated from the following:    Height as of this encounter: 167.6 cm (66\").    Weight as of this encounter: 95.3 kg (210 lb).         Cardiovascular:      PMI at left midclavicular line. Normal rate. Regular rhythm. Normal S1. Normal S2.       Murmurs: There is a high frequency blowing holosystolic murmur at the apex.      No gallop.  No click. No rub.   Pulses:     Intact distal pulses.   Edema:     Peripheral edema absent.             Assessment and Plan     Assessment & Plan  Coronary artery disease involving native coronary artery of native heart, unspecified whether angina present  Coronary Artery Disease (OPTIONAL): Coronary artery disease is stable.  Continue current treatment regimen.  Cardiac " status will be reassessed in 6 months.         Hypertension, essential  High BP, takes Toprol XL PRN BP over 160 and not daily as daily toprol XL was associated withdizziness and low BP.  We will reduce Topro XL to 12.5 mg po daily  Cntinue Valsartan hct and Toprol XL.  BP og.  Low salt DASH diet.    Orders:    Basic Metabolic Panel; Future    Microalbumin / Creatinine Urine Ratio - Urine, Clean Catch; Future    Hyperlipidemia LDL goal <55   Continue Repatha and Pravastatin.    Orders:    Hepatic Function Panel; Future    High Sensitivity CRP; Future    Lipoprotein A (LPA); Future    Lipid Panel; Future    Paroxysmal atrial fibrillation  CHADS VASC 3  Stable  Continue ToprolXL and Propafenone  Orders:    proBNP; Future    amiodarone (Pacerone) 100 MG tablet; Take 1 tablet by mouth Daily.    Chronic venous insufficiency of lower extremity  Improving.  30 mm/hg knee high compression stockings.  Leg elevation x 30 minutes 3 times daily.  Increase your  walking schedule and distance covered.  Weight loss diet.         Hypertensive heart and chronic kidney disease with heart failure and stage 1 through stage 4 chronic kidney disease, or unspecified chronic kidney disease      Orders:    proBNP; Future      Follow Up  No follow-ups on file.    [facility] Cardiology

## 2025-03-12 NOTE — ASSESSMENT & PLAN NOTE
CHADS VASC 3  Stable  Continue ToprolXL and Propafenone  Orders:    proBNP; Future    amiodarone (Pacerone) 100 MG tablet; Take 1 tablet by mouth Daily.

## 2025-03-12 NOTE — TELEPHONE ENCOUNTER
PATIENT CALLED IS NEEDING HER PACERONE REFILLED.    PATIENT SAID HAS APPOINTMENT TOMORROW 03/13/2025 SEE DR JHAVERI.

## 2025-03-13 ENCOUNTER — OFFICE VISIT (OUTPATIENT)
Age: OVER 89
End: 2025-03-13
Payer: MEDICARE

## 2025-03-13 VITALS
DIASTOLIC BLOOD PRESSURE: 60 MMHG | WEIGHT: 210 LBS | HEIGHT: 66 IN | SYSTOLIC BLOOD PRESSURE: 159 MMHG | BODY MASS INDEX: 33.75 KG/M2 | HEART RATE: 69 BPM

## 2025-03-13 DIAGNOSIS — I87.2 CHRONIC VENOUS INSUFFICIENCY OF LOWER EXTREMITY: ICD-10-CM

## 2025-03-13 DIAGNOSIS — I13.0 HYPERTENSIVE HEART AND CHRONIC KIDNEY DISEASE WITH HEART FAILURE AND STAGE 1 THROUGH STAGE 4 CHRONIC KIDNEY DISEASE, OR UNSPECIFIED CHRONIC KIDNEY DISEASE: ICD-10-CM

## 2025-03-13 DIAGNOSIS — E78.5 HYPERLIPIDEMIA LDL GOAL <55: ICD-10-CM

## 2025-03-13 DIAGNOSIS — I25.10 CORONARY ARTERY DISEASE INVOLVING NATIVE CORONARY ARTERY OF NATIVE HEART, UNSPECIFIED WHETHER ANGINA PRESENT: Primary | ICD-10-CM

## 2025-03-13 DIAGNOSIS — I48.0 PAROXYSMAL ATRIAL FIBRILLATION: ICD-10-CM

## 2025-03-13 DIAGNOSIS — I10 HYPERTENSION, ESSENTIAL: ICD-10-CM

## 2025-03-13 RX ORDER — BLOOD SUGAR DIAGNOSTIC
1 STRIP MISCELLANEOUS AS NEEDED
COMMUNITY
Start: 2025-02-06

## 2025-03-13 RX ORDER — AMIODARONE HYDROCHLORIDE 100 MG/1
100 TABLET ORAL DAILY
Qty: 90 TABLET | Refills: 1 | Status: SHIPPED | OUTPATIENT
Start: 2025-03-13

## 2025-03-13 RX ORDER — LANCETS 33 GAUGE
1 EACH MISCELLANEOUS DAILY
COMMUNITY
Start: 2025-02-17

## 2025-03-13 RX ORDER — DIPHENOXYLATE HYDROCHLORIDE AND ATROPINE SULFATE 2.5; .025 MG/1; MG/1
1 TABLET ORAL DAILY
COMMUNITY

## 2025-03-17 DIAGNOSIS — I65.22 CAROTID STENOSIS, ASYMPTOMATIC, LEFT: ICD-10-CM

## 2025-03-17 RX ORDER — CLOPIDOGREL BISULFATE 75 MG/1
75 TABLET ORAL DAILY
Qty: 90 TABLET | Refills: 0 | Status: SHIPPED | OUTPATIENT
Start: 2025-03-17

## 2025-03-25 ENCOUNTER — TELEPHONE (OUTPATIENT)
Age: OVER 89
End: 2025-03-25

## 2025-03-25 ENCOUNTER — TELEPHONE (OUTPATIENT)
Age: OVER 89
End: 2025-03-25
Payer: MEDICARE

## 2025-03-27 DIAGNOSIS — I48.0 PAROXYSMAL ATRIAL FIBRILLATION: Primary | ICD-10-CM

## 2025-03-27 RX ORDER — RIVAROXABAN 20 MG/1
20 TABLET, FILM COATED ORAL DAILY
Qty: 90 TABLET | Refills: 1 | Status: SHIPPED | OUTPATIENT
Start: 2025-03-27

## 2025-04-10 ENCOUNTER — OFFICE VISIT (OUTPATIENT)
Age: OVER 89
End: 2025-04-10
Payer: MEDICARE

## 2025-04-10 VITALS
WEIGHT: 208 LBS | HEIGHT: 66 IN | DIASTOLIC BLOOD PRESSURE: 106 MMHG | HEART RATE: 68 BPM | BODY MASS INDEX: 33.43 KG/M2 | SYSTOLIC BLOOD PRESSURE: 156 MMHG

## 2025-04-10 DIAGNOSIS — I48.0 PAROXYSMAL ATRIAL FIBRILLATION: ICD-10-CM

## 2025-04-10 DIAGNOSIS — I1A.0 RESISTANT HYPERTENSION: Primary | ICD-10-CM

## 2025-04-10 DIAGNOSIS — N18.31 STAGE 3A CHRONIC KIDNEY DISEASE: ICD-10-CM

## 2025-04-10 DIAGNOSIS — E78.41 ELEVATED LIPOPROTEIN(A): ICD-10-CM

## 2025-04-10 DIAGNOSIS — E78.5 HYPERLIPIDEMIA LDL GOAL <55: ICD-10-CM

## 2025-04-10 NOTE — ASSESSMENT & PLAN NOTE
While not within guidelines she is unable to tolerate lower BP  Therefore-continue current meds.  Orders:    Comprehensive Metabolic Panel; Future

## 2025-04-10 NOTE — PROGRESS NOTES
"    Cardiology Established Patient Note     Name: Kim Ramsay  :   10/28/1934  PCP: Geno Cornell DO  Date:   2025  Department: [facility] [place] CARD [facility] CARDIOLOGY  [address]  [place] [address]  Fax [phone]  Phone [phone]    Chief Complaint: I have high BP    Subjective     History of Present Illness  Kim Ramsay is a 89 y.o. female who is here follow-up.   Recently with accelerated HTN up to 190 mm/Hg  Bp is lower now and well tolerated. She becomes dizzy and weak with BP<160 .  BP log reviewed -systolics 170s- acceptable for her.    Problem list  1 CAD     -Cath 2009: Mild diastolic left heart failure, mild CAD, 2 out of 2 patent stents    2-hypertension    -Abdominal ultrasound 3/19/2024: No AAA         3-hyperlipidemia,     4-paroxysmal A-fib,     5-chronic venous insufficiency.      -Venous 2023: Bilateral GSV and left SSV remain occluded following closure     Echo 2023: EF 65%, diastolic dysfunction grade 1, trace MR, trace TR, mild pulmonary hypertension    EKG- 1/15/25   NSR  Labs 3/28/25:    UACR 10.2  K 3.9  Cr1.1  eGFR 47.2  Nt pro BNP 1013  LDL 24  HSCRP 1.6  Lpoprotein a 233mmol/L    The following data was reviewed by: Galo Rubin MD on 2025                Objective     Vital Signs:  BP (!) 156/106 (BP Location: Right arm, Patient Position: Sitting)   Pulse 68   Ht 167.6 cm (66\")   Wt 94.3 kg (208 lb)   BMI 33.57 kg/m²   Estimated body mass index is 33.57 kg/m² as calculated from the following:    Height as of this encounter: 167.6 cm (66\").    Weight as of this encounter: 94.3 kg (208 lb).         Cardiovascular:      PMI at left midclavicular line. Normal rate. Regular rhythm. Normal S1. Normal S2.       Murmurs: There is a high frequency blowing holosystolic murmur at the apex.      No gallop.  No click. No rub.   Pulses:     Intact distal pulses.   Edema:     Peripheral " edema absent.             Assessment and Plan     Assessment & Plan  Resistant hypertension  While not within guidelines she is unable to tolerate lower BP  Therefore-continue current meds.  Orders:    Comprehensive Metabolic Panel; Future    Paroxysmal atrial fibrillation  Stable  In NSR  Continue current meds.       Hyperlipidemia LDL goal <55   Lipid abnormalities are stable    Plan:  Continue same medication/s without change.      Discussed medication dosage, use, side effects, and goals of treatment in detail.    Counseled patient on lifestyle modifications to help control hyperlipidemia.     Patient Treatment Goals:   LDL goal is less than 70    Followup in 6 months.    Orders:    Hepatic Function Panel; Future    High Sensitivity CRP; Future    Lipoprotein A (LPA); Future    Lipid Panel; Future    Elevated lipoprotein(a)  Stable   Continue Repatha         Stage 3a chronic kidney disease      Orders:    Comprehensive Metabolic Panel; Future    Duplex Renal Artery - Bilateral Complete CAR; Future      Follow Up  No follow-ups on file.    [facility] Cardiology

## 2025-04-10 NOTE — ASSESSMENT & PLAN NOTE
Lipid abnormalities are stable    Plan:  Continue same medication/s without change.      Discussed medication dosage, use, side effects, and goals of treatment in detail.    Counseled patient on lifestyle modifications to help control hyperlipidemia.     Patient Treatment Goals:   LDL goal is less than 70    Followup in 6 months.    Orders:    Hepatic Function Panel; Future    High Sensitivity CRP; Future    Lipoprotein A (LPA); Future    Lipid Panel; Future

## 2025-05-09 RX ORDER — PRAVASTATIN SODIUM 80 MG/1
80 TABLET ORAL
Qty: 90 TABLET | Refills: 1 | Status: SHIPPED | OUTPATIENT
Start: 2025-05-09

## 2025-05-22 ENCOUNTER — OFFICE VISIT (OUTPATIENT)
Age: OVER 89
End: 2025-05-22
Payer: MEDICARE

## 2025-05-22 VITALS
HEART RATE: 68 BPM | DIASTOLIC BLOOD PRESSURE: 85 MMHG | HEIGHT: 66 IN | WEIGHT: 210 LBS | BODY MASS INDEX: 33.75 KG/M2 | SYSTOLIC BLOOD PRESSURE: 177 MMHG

## 2025-05-22 DIAGNOSIS — I10 PRIMARY HYPERTENSION: Primary | ICD-10-CM

## 2025-05-22 DIAGNOSIS — I87.2 CHRONIC VENOUS INSUFFICIENCY OF LOWER EXTREMITY: ICD-10-CM

## 2025-05-22 DIAGNOSIS — E78.5 HYPERLIPIDEMIA LDL GOAL <55: ICD-10-CM

## 2025-05-22 NOTE — ASSESSMENT & PLAN NOTE
Lipid abnormalities are stable    Plan:  Continue same medication/s without change.      Discussed medication dosage, use, side effects, and goals of treatment in detail.    Counseled patient on lifestyle modifications to help control hyperlipidemia.     Patient Treatment Goals:   LDL goal is less than 55    Followup in 6 months.

## 2025-05-22 NOTE — PROGRESS NOTES
"    Cardiology Established Patient Note     Name: Kim Ramsay  :   10/28/1934  PCP: Geno Cornell DO  Date:   2025  Department: [facility] [place] [facility] CARDIOLOGY  [address]  [facility] [address]  Fax [phone]  Phone [phone]    Chief Complaint: I have high BP    Subjective     History of Present Illness  Kim Ramsay is a 89 y.o. female who is here follow-up.   Recently with accelerated HTN up to 190 mm/Hg. BP is still in the 170 mm/HG range  But at times BP is in the 120s and she is aprehensive about makig any changes at this time.  Recent labs 25 reviewed with the patient-  eGFR-56.2  LDL 9  Vef721.5  She is tolerating Repatha andPravastatin.  Denies LE swelling or suzie.  Problem list  1 CAD     -Cath 2009: Mild diastolic left heart failure, mild CAD, 2 out of 2 patent stents    2-hypertension    -Abdominal ultrasound 3/19/2024: No AAA         3-hyperlipidemia,     4-paroxysmal A-fib,     5-chronic venous insufficiency.      -Venous 2023: Bilateral GSV and left SSV remain occluded following closure     Echo 2023: EF 65%, diastolic dysfunction grade 1, trace MR, trace TR, mild pulmonary hypertension    EKG- 1/15/25   NSR  Labs 3/28/25:    UACR 10.2  K 3.9  Cr1.1  eGFR 47.2  Nt pro BNP 1013  LDL 24  HSCRP 1.6  Lpoprotein a 233mmol/L    The following data was reviewed by: Galo Rubin MD on 2025                Objective     Vital Signs:  /85 (BP Location: Right arm, Patient Position: Sitting)   Pulse 68   Ht 167.6 cm (66\")   Wt 95.3 kg (210 lb)   BMI 33.89 kg/m²   Estimated body mass index is 33.89 kg/m² as calculated from the following:    Height as of this encounter: 167.6 cm (66\").    Weight as of this encounter: 95.3 kg (210 lb).         Cardiovascular:      PMI at left midclavicular line. Normal rate. Regular rhythm. Normal S1. Normal S2.       Murmurs: There is a high frequency " blowing holosystolic murmur at the apex.      No gallop.  No click. No rub.   Pulses:     Intact distal pulses.   Edema:     Peripheral edema absent.             Assessment and Plan     Assessment & Plan  Primary hypertension  Improving.  Continue BP log         Hyperlipidemia LDL goal <55   Lipid abnormalities are stable    Plan:  Continue same medication/s without change.      Discussed medication dosage, use, side effects, and goals of treatment in detail.    Counseled patient on lifestyle modifications to help control hyperlipidemia.     Patient Treatment Goals:   LDL goal is less than 55    Followup in 6 months.         Chronic venous insufficiency of lower extremity  30 mm/hg knee high compression stockings.  Leg elevation x 30 minutes 3 times daily.  Increase your  walking schedule and distance covered.  Weight loss diet.           Follow Up  No follow-ups on file.    [facility] Cardiology

## 2025-05-27 RX ORDER — EVOLOCUMAB 140 MG/ML
140 INJECTION, SOLUTION SUBCUTANEOUS
Qty: 6 ML | Refills: 1 | Status: SHIPPED | OUTPATIENT
Start: 2025-05-27

## 2025-05-27 NOTE — TELEPHONE ENCOUNTER
Rx Refill Note  Requested Prescriptions     Pending Prescriptions Disp Refills    Repatha SureClick solution auto-injector SureClick injection 6 mL 1     Sig: Inject 1 mL under the skin into the appropriate area as directed Every 14 (Fourteen) Days.      Patient called requesting a refill on medication listed above. Medication needs to be sent to [facility] on [address] in [place].     Last office visit with prescribing clinician: 5/22/2025   Last telemedicine visit with prescribing clinician: Visit date not found   Next office visit with prescribing clinician: 6/19/2025                        Would you like a call back once the refill request has been completed: [] Yes [] No [] N/A    If the office needs to give you a call back, can they leave a voicemail: [] Yes [] No [] N/A    Pharmacy Info    Last Fill Date:   Rx Written Date:   Prescribed Qty:   Additional Details from Pharmacy:       Kimberly Live MA  05/27/25, 15:26 EDT

## 2025-06-12 DIAGNOSIS — I65.22 CAROTID STENOSIS, ASYMPTOMATIC, LEFT: ICD-10-CM

## 2025-06-12 RX ORDER — CLOPIDOGREL BISULFATE 75 MG/1
75 TABLET ORAL DAILY
Qty: 90 TABLET | Refills: 0 | Status: SHIPPED | OUTPATIENT
Start: 2025-06-12

## 2025-07-03 ENCOUNTER — OFFICE VISIT (OUTPATIENT)
Age: OVER 89
End: 2025-07-03
Payer: MEDICARE

## 2025-07-03 VITALS
BODY MASS INDEX: 33.75 KG/M2 | WEIGHT: 210 LBS | SYSTOLIC BLOOD PRESSURE: 179 MMHG | HEIGHT: 66 IN | HEART RATE: 71 BPM | DIASTOLIC BLOOD PRESSURE: 79 MMHG

## 2025-07-03 DIAGNOSIS — E78.5 HYPERLIPIDEMIA LDL GOAL <100: ICD-10-CM

## 2025-07-03 DIAGNOSIS — I10 PRIMARY HYPERTENSION: Primary | ICD-10-CM

## 2025-07-03 DIAGNOSIS — I87.2 CHRONIC VENOUS INSUFFICIENCY OF LOWER EXTREMITY: ICD-10-CM

## 2025-07-03 RX ORDER — HYDRALAZINE HYDROCHLORIDE 100 MG/1
100 TABLET, FILM COATED ORAL 2 TIMES DAILY
Qty: 60 TABLET | Refills: 1 | Status: SHIPPED | OUTPATIENT
Start: 2025-07-03

## 2025-07-03 NOTE — ASSESSMENT & PLAN NOTE
Accelerated    Orders:    Comprehensive Metabolic Panel; Future    Microalbumin / Creatinine Urine Ratio - Urine, Clean Catch; Future    hydrALAZINE (APRESOLINE) 100 MG tablet; Take 1 tablet by mouth 2 (Two) Times a Day.

## 2025-07-03 NOTE — PROGRESS NOTES
"    Cardiology Established Patient Note     Name: Kim Ramsay  :   10/28/1934  PCP: Geno Cornell,   Date:   2025  Department: [facility] [place] [facility] CARDIOLOGY  [address]  [facility] [address]  Fax [phone]  Phone [phone]    Chief Complaint: I have high BP    Problem list  1 CAD     -Cath 2009: Mild diastolic left heart failure, mild CAD, 2 out of 2 patent stents     2-hypertension    -Abdominal ultrasound 3/19/2024: No AAA         3-hyperlipidemia,      4-paroxysmal A-fib,      5-chronic venous insufficiency.      -Venous 2023: Bilateral GSV and left SSV remain occluded following closure        Subjective     History of Present Illness  Kim Ramsay is a 89 y.o. female who is here follow-up.   Recently  with labile BP  Brought BP log-She is markedly hypertensive with BP greater than 200 last week. Went to local ED and ischarged.  Had severe LE swelling with calcium channel blockers and severe hyponatremia with Aldactone.  Recent labs 25 reviewed with the patient-  eGFR-56.2  LDL 9  Wov965.5  She is tolerating Repatha andPravastatin.  Denies LE swelling or suzie.      EKG- 1/15/25   NSR  Labs 3/28/25:    UACR 10.2  K 3.9  Cr1.1  eGFR 47.2  Nt pro BNP 1013  LDL 24  HSCRP 1.6  Lpoprotein a 233mmol/L    The following data was reviewed by: Galo Rubin MD on 2025                Objective     Vital Signs:  /79 (BP Location: Right arm, Patient Position: Sitting, Cuff Size: Adult)   Pulse 71   Ht 167.6 cm (66\")   Wt 95.3 kg (210 lb)   BMI 33.89 kg/m²   Estimated body mass index is 33.89 kg/m² as calculated from the following:    Height as of this encounter: 167.6 cm (66\").    Weight as of this encounter: 95.3 kg (210 lb).         Cardiovascular:      PMI at left midclavicular line. Normal rate. Regular rhythm. Normal S1. Normal S2.       Murmurs: There is a high frequency blowing holosystolic " murmur at the apex.      No gallop.  No click. No rub.   Pulses:     Intact distal pulses.   Edema:     Peripheral edema absent.             Assessment and Plan     Assessment & Plan  Primary hypertension  Accelerated    Orders:    Comprehensive Metabolic Panel; Future    Microalbumin / Creatinine Urine Ratio - Urine, Clean Catch; Future    hydrALAZINE (APRESOLINE) 100 MG tablet; Take 1 tablet by mouth 2 (Two) Times a Day.    Hyperlipidemia LDL goal <100   Within guidelines    Orders:    Hepatic Function Panel; Future    High Sensitivity CRP; Future    Lipoprotein A (LPA); Future    Lipid Panel; Future    Chronic venous insufficiency of lower extremity  Stable  No change from previous  30 mm/hg knee high compression stockings.  Leg elevation x 30 minutes 3 times daily.  Increase your  walking schedule and distance covered.  Weight loss diet.           Follow Up  Return in about 2 weeks (around 7/17/2025).    [facility] Cardiology

## 2025-07-08 ENCOUNTER — TELEPHONE (OUTPATIENT)
Age: OVER 89
End: 2025-07-08
Payer: MEDICARE

## 2025-07-08 NOTE — TELEPHONE ENCOUNTER
Patient called requesting a refill on Metoprolol but thinks she needs to increase back to a whole tab QD. Patient states that she cannot take Hydralazine because it drops her BP down in the 90s and makes her have a lot of gas. Prior to taking Hydralazine, her BP was running 160s-190s. Please advise.

## 2025-07-09 NOTE — TELEPHONE ENCOUNTER
Looking at [place], patient has been on hydralazine for over a year so if the gas is a new symptom and is likely not the hydralazine causing this. If patient does not want to take this medication, we can try amlodipine 5 mg daily and see what her blood pressure runs with that.

## 2025-07-09 NOTE — TELEPHONE ENCOUNTER
What has her heart rate been running? Metoprolol wont control her blood pressure like that hydralazine does, it will lower her HR.  Per last note, she should be taking valsartan 320 mg daily & hydralazine 100 mg BID. She can try halfing hydralazine to 50 mg BID & continue to monitor BP/symptoms

## 2025-07-09 NOTE — TELEPHONE ENCOUNTER
HR is running 64-70's during the day, in the evening may get as high as 80. She says the hydralazine causes toooo much gas on her stomach. She cannot tolerate it.

## 2025-07-21 ENCOUNTER — TELEPHONE (OUTPATIENT)
Age: OVER 89
End: 2025-07-21
Payer: MEDICARE

## 2025-07-21 NOTE — TELEPHONE ENCOUNTER
Her blood pressure is likely high because she stopped taking hydralazine because of reported side effects of gas.  Her legs are probably swelling because we started her on amlodipine 5 mg daily and that is a side effect of that medication.  I would recommend her taking the hydralazine until her follow-up appointment with Dr. Rubin to discuss further.

## 2025-07-21 NOTE — TELEPHONE ENCOUNTER
Per patient she will not take a whole hydralazine and she is not taking amlodipine.     Please advise?

## 2025-07-21 NOTE — TELEPHONE ENCOUNTER
Patient called stating that she is having issues with high BP. Patient reports BP running in the 160-205 systolic with symptoms of head pressure. BP is higher in the evening. Patient states this has been going on since 07/01.     Patient also reports swelling in the left leg, foot, and ankle for one week.     Patient request a sooner appt. Please advise.

## 2025-07-23 NOTE — PROGRESS NOTES
"    Cardiology Established Patient Note     Name: Kim Ramsay  :   10/28/1934  PCP: Geno Cornell,   Date:   2025  Department: [facility] [place] [facility] CARDIOLOGY  [address]  [facility] [address]  Fax [phone]  Phone [phone]    Chief Complaint: I have high BP    Problem list  1 CAD     -Cath 2009: Mild diastolic left heart failure, mild CAD, 2 out of 2 patent stents     2-hypertension    -Abdominal ultrasound 3/19/2024: No AAA         3-hyperlipidemia,      4-paroxysmal A-fib,      5-chronic venous insufficiency.      -Venous 2023: Bilateral GSV and left SSV remain occluded following closure  6.HCTZ associated hyponatremia      Subjective     History of Present Illness  Kim Ramsay is a 89 y.o. female who is here follow-up.   Recently  with labile BP with several ER visits locally. Given Clonidine PRN. BP is in the 170s per log.  Previously had hyponatremia with HCTZ. Also not tolerating normal Bps and becomes dizzy.    Blood pressure log today-  Had severe LE swelling with calcium channel blockers and severe hyponatremia with Aldactone.        Recent labs 25 reviewed with the patient-  eGFR-56.2  LDL 9  Blood pressure log  Cyd683.5  She is tolerating Repatha andPravastatin.  Denies LE swelling or suzie.      EKG- 1/15/25   NSR  Labs 3/28/25:    UACR 10.2  K 3.9  Cr1.1  eGFR 47.2  Nt pro BNP 1013  LDL 24  HSCRP 1.6  Lpoprotein a 233mmol/L    The following data was reviewed by: Galo Rubin MD on 2025                Objective     Vital Signs:  There were no vitals taken for this visit.  Estimated body mass index is 33.89 kg/m² as calculated from the following:    Height as of 7/3/25: 167.6 cm (66\").    Weight as of 7/3/25: 95.3 kg (210 lb).         Cardiovascular:      PMI at left midclavicular line. Normal rate. Regular rhythm. Normal S1. Normal S2.       Murmurs: There is a high frequency blowing " holosystolic murmur at the apex.      No gallop.  No click. No rub.   Pulses:     Intact distal pulses.   Edema:     Peripheral edema absent.             Assessment and Plan     Assessment & Plan  Primary hypertension  Improving  DC hydralazine due to severre abdominal bloating.  Continue BP log.  Add Aldactone  BMP 2-3 weeks  Orders:    spironolactone (ALDACTONE) 25 MG tablet; Take 1 tablet by mouth Daily.    Basic Metabolic Panel; Future    Hyperlipidemia LDL goal <55   Lipid abnormalities are stable    Plan:  Continue same medication/s without change.      Discussed medication dosage, use, side effects, and goals of treatment in detail.    Counseled patient on lifestyle modifications to help control hyperlipidemia.     Patient Treatment Goals:   LDL goal is less than 70    Followup in 6 months.         Stage 3a chronic kidney disease  We had ordered a renal ultrasound in April but this was not done yet.           Follow Up  No follow-ups on file.    [facility] Cardiology

## 2025-07-23 NOTE — ASSESSMENT & PLAN NOTE
Improving  DC hydralazine due to severre abdominal bloating.  Continue BP log.  Add Aldactone  BMP 2-3 weeks  Orders:    spironolactone (ALDACTONE) 25 MG tablet; Take 1 tablet by mouth Daily.    Basic Metabolic Panel; Future

## 2025-07-24 ENCOUNTER — OFFICE VISIT (OUTPATIENT)
Age: OVER 89
End: 2025-07-24
Payer: MEDICARE

## 2025-07-24 VITALS
HEIGHT: 66 IN | HEART RATE: 73 BPM | SYSTOLIC BLOOD PRESSURE: 140 MMHG | WEIGHT: 210 LBS | DIASTOLIC BLOOD PRESSURE: 62 MMHG | BODY MASS INDEX: 33.75 KG/M2

## 2025-07-24 DIAGNOSIS — N18.31 STAGE 3A CHRONIC KIDNEY DISEASE: ICD-10-CM

## 2025-07-24 DIAGNOSIS — I10 PRIMARY HYPERTENSION: ICD-10-CM

## 2025-07-24 DIAGNOSIS — E78.5 HYPERLIPIDEMIA LDL GOAL <55: Primary | ICD-10-CM

## 2025-07-24 DIAGNOSIS — I65.22 CAROTID STENOSIS, ASYMPTOMATIC, LEFT: ICD-10-CM

## 2025-07-24 DIAGNOSIS — I48.0 PAROXYSMAL ATRIAL FIBRILLATION: ICD-10-CM

## 2025-07-24 DIAGNOSIS — I10 ESSENTIAL HYPERTENSION: ICD-10-CM

## 2025-07-24 RX ORDER — AMIODARONE HYDROCHLORIDE 100 MG/1
100 TABLET ORAL DAILY
Qty: 90 TABLET | Refills: 1 | Status: SHIPPED | OUTPATIENT
Start: 2025-07-24

## 2025-07-24 RX ORDER — RIVAROXABAN 20 MG/1
20 TABLET, FILM COATED ORAL DAILY
Qty: 90 TABLET | Refills: 1 | Status: SHIPPED | OUTPATIENT
Start: 2025-07-24

## 2025-07-24 RX ORDER — FUROSEMIDE 20 MG/1
20 TABLET ORAL DAILY
Qty: 90 TABLET | Refills: 1 | Status: SHIPPED | OUTPATIENT
Start: 2025-07-24

## 2025-07-24 RX ORDER — CLONIDINE HYDROCHLORIDE 0.1 MG/1
0.1 TABLET ORAL
COMMUNITY
Start: 2025-07-12

## 2025-07-24 RX ORDER — METOPROLOL SUCCINATE 25 MG/1
25 TABLET, EXTENDED RELEASE ORAL DAILY
Qty: 90 TABLET | Refills: 1 | Status: SHIPPED | OUTPATIENT
Start: 2025-07-24

## 2025-07-24 RX ORDER — SPIRONOLACTONE 25 MG/1
25 TABLET ORAL DAILY
Qty: 30 TABLET | Refills: 11 | Status: SHIPPED | OUTPATIENT
Start: 2025-07-24

## 2025-07-24 RX ORDER — VALSARTAN 320 MG/1
320 TABLET ORAL DAILY
Qty: 90 TABLET | Refills: 1 | Status: SHIPPED | OUTPATIENT
Start: 2025-07-24

## 2025-07-24 RX ORDER — CLOPIDOGREL BISULFATE 75 MG/1
75 TABLET ORAL DAILY
Qty: 90 TABLET | Refills: 1 | Status: SHIPPED | OUTPATIENT
Start: 2025-07-24

## 2025-07-28 ENCOUNTER — TELEPHONE (OUTPATIENT)
Age: OVER 89
End: 2025-07-28
Payer: MEDICARE

## 2025-07-28 NOTE — TELEPHONE ENCOUNTER
Patient called stating that she is still have high BP after medication changes at last appt on 07/24/2025. Patient states she was not able to start new medication unitl 07/26/2025 and BP is reading as follows.     07/26/2025   AM: 154/77 HR 66  Afternoon: 197/75 HR 77    07/27/2025  AM: 176/81 HR 61  PM: 215/100 HR 73    07/28/2025  AM: 176/84 HR 62  Afternoon: 180/80 HR 68      Patient states she is symptomatic when BP is high and her head feels funny. Please advise.

## 2025-07-29 NOTE — TELEPHONE ENCOUNTER
Please verify the BP medications she is taking, He stopped hydralazine due to abd swelling. Started Aldactone is she taking this?

## 2025-07-30 NOTE — TELEPHONE ENCOUNTER
Spoke to patient she is taking all those, but the clonidine she only takes when her blood pressure is high.     Patient does state her HR has been low as well when waking up. It has been around the 40's.

## 2025-07-30 NOTE — TELEPHONE ENCOUNTER
SPK TO PT AFTER KINJAL, WANTS TO KEEP 8/14 APPT W/ ERES IN [place] AND UNDERSTOOD INSTRUCTIONS TO GO TO ER [facility]WN NOW AND THEN

## 2025-07-31 NOTE — PROGRESS NOTES
Cardiology Patient Note     Name: Kim Ramsay  :   10/28/1934  PCP: Geno Cornell DO  Date:   2025  Department: [facility] [place] [facility] CARDIOLOGY  [address]  [facility] [address]  Fax [phone]  Phone [phone]    Chief Complaint   Patient presents with    Hypertension    Hyperlipidemia     Problem list  CAD  Cath 2009: Mild diastolic left heart failure, mild CAD, 2 out of 2 patent stents   Hypertension  Abdominal ultrasound 3/19/2024: No AAA   Hyperlipidemia  Diastolic left heart failure  Paroxysmal A-fib  Echo 2023: EF 60 to 65%, diastolic dysfunction grade 1, trace MR, trace TR, mild pulmonary hypertension  Chronic venous insufficiency  Venous 2023: Bilateral GSV and left SSV remain occluded following closure   Chronic hyponatremia  Likely related to diuretics  Carotid disease  Carotid ultrasound 2024: TAJ 50 to 69% stenosis, LICA greater than 70% stenosis, LICA greater than 50% stenosis    Subjective     History of Present Illness  Kim Ramsay is a 89 y.o. female who presents today with concerns regarding her blood pressure. Patient was seen multiple times recently with blood pressure issues. At her last visit on 2025, patient was placed on Aldactone 25 mg daily.  She presents today with her blood pressure log and systolics continue to remain elevated 150-190's.  Patient states when her blood pressure is elevated she feels flushed and can tell it is running high.  She denies any chest pain, chest pressure, shortness of breath, palpitations, or lower extremity edema. She states her heart rate has been running low as well and has seen it as low as 44.    Past Medical History:   Diagnosis Date    Arthritis     Atrial fibrillation     Carotid disease, bilateral     taj 50-69% stenosis. lica >/=70% stenosis. leca > 50% stenosis    Coronary atherosclerosis of native coronary vessel      Debility     Deep vein thrombosis     Diastolic heart failure     left    DVT (deep venous thrombosis)     Hyperlipidemia     Hypertension     Hyponatremia     Iron deficiency anemia     Mitral regurgitation     mild by echo    Myocardial infarction     Obesity     Pre-diabetes     Shortness of breath     Tricuspid regurgitation     mild by echo    UTI (urinary tract infection)       Past Surgical History:   Procedure Laterality Date    CARDIAC CATHETERIZATION  01/21/2006    EF 65%- return two weeks for CBI to right posterior descending artery- Dr Rubin    CARDIAC CATHETERIZATION  02/27/2006    cypher to right posterior descending artery- Dr Rubin    CARDIAC CATHETERIZATION  01/02/2009    Dr Rubin SJE - EF 75% NML LV systolic function. mild diastolic lhf. mild cad, with 2/2 patent stents    CAROTID STENT Left 05/24/2024    Procedure: Carotid Stent;  Surgeon: Sanjay Sneed MD;  Location: [facility] INVASIVE LOCATION;  Service: Interventional Radiology;  Laterality: Left;    CATARACT EXTRACTION Bilateral     CHOLECYSTECTOMY  1991    HYSTERECTOMY  1983    OTHER SURGICAL HISTORY  03/16/2017    left and right greater saphenous vein RF ablation was completed without difficulty    OTHER SURGICAL HISTORY  03/17/2017    veneefit endovenous therapy #2- left smaller saphenous vein rf ablation was completed without difficulty     Family History   Problem Relation Age of Onset    Heart attack Mother 61    Stroke Mother     Breast cancer Mother     COPD Father     Lung disease Father 87    Aneurysm Brother     Heart attack Brother      Social History     Socioeconomic History    Marital status:     Number of children: 2   Tobacco Use    Smoking status: Never     Passive exposure: Never    Smokeless tobacco: Never   Vaping Use    Vaping status: Never Used   Substance and Sexual Activity    Alcohol use: Never    Drug use: Never    Sexual activity: Defer     Allergies   Allergen Reactions    Ciprofloxacin Other (See  "Comments)     Tightness in chest      Norvasc [Amlodipine] Swelling    Ranitidine Unknown - Low Severity    Reglan [Metoclopramide] Unknown - Low Severity    Simvastatin Myalgia    Sulfamethoxazole Rash    Sulfamethoxazole-Trimethoprim Other (See Comments) and GI Intolerance     depression    Trimethoprim Rash       Current Outpatient Medications:     amiodarone (Pacerone) 100 MG tablet, Take 1 tablet by mouth Daily., Disp: 90 tablet, Rfl: 1    cloNIDine (CATAPRES) 0.1 MG tablet, Take 1 tablet by mouth. TAKE 1 TABLET FOR SYSTOLIC BP  OR HIGHER, Disp: , Rfl:     clopidogrel (PLAVIX) 75 MG tablet, Take 1 tablet by mouth Daily., Disp: 90 tablet, Rfl: 1    furosemide (LASIX) 20 MG tablet, Take 1 tablet by mouth Daily., Disp: 90 tablet, Rfl: 1    loratadine (CLARITIN) 10 MG tablet, Take 1 tablet by mouth Daily., Disp: , Rfl:     metoprolol succinate XL (TOPROL-XL) 25 MG 24 hr tablet, Take 1 tablet by mouth Daily., Disp: 90 tablet, Rfl: 1    Multiple Vitamins-Minerals (ICAPS AREDS 2 PO), Take 1 tablet by mouth Daily., Disp: , Rfl:     multivitamin (THERAGRAN) tablet tablet, Daily. (Patient taking differently: Take  by mouth Daily.), Disp: , Rfl:     pravastatin (PRAVACHOL) 80 MG tablet, Take 1 tablet by mouth every night at bedtime., Disp: 90 tablet, Rfl: 1    Repatha SureClick solution auto-injector SureClick injection, Inject 1 mL under the skin into the appropriate area as directed Every 14 (Fourteen) Days., Disp: 6 mL, Rfl: 1    spironolactone (ALDACTONE) 25 MG tablet, Take 1 tablet by mouth Daily., Disp: 30 tablet, Rfl: 11    valsartan (DIOVAN) 320 MG tablet, Take 1 tablet by mouth Daily., Disp: 90 tablet, Rfl: 1    Xarelto 20 MG tablet, Take 1 tablet by mouth Daily., Disp: 90 tablet, Rfl: 1    Objective     Vital Signs:  BP (!) 185/72 (BP Location: Left arm, Patient Position: Sitting)   Pulse 63   Ht 167.6 cm (66\")   Wt 92.4 kg (203 lb 12.8 oz)   BMI 32.89 kg/m²   Estimated body mass index is 32.89 kg/m² " "as calculated from the following:    Height as of this encounter: 167.6 cm (66\").    Weight as of this encounter: 92.4 kg (203 lb 12.8 oz).       Physical Exam  Vitals reviewed.   Constitutional:       Appearance: Normal appearance.   Eyes:      Extraocular Movements: Extraocular movements intact.      Pupils: Pupils are equal, round, and reactive to light.   Cardiovascular:      Rate and Rhythm: Normal rate and regular rhythm.   Pulmonary:      Effort: Pulmonary effort is normal.      Breath sounds: Normal breath sounds.   Musculoskeletal:         General: Normal range of motion.      Cervical back: Normal range of motion and neck supple.   Skin:     General: Skin is warm and dry.      Capillary Refill: Capillary refill takes less than 2 seconds.   Neurological:      General: No focal deficit present.      Mental Status: She is alert and oriented to person, place, and time.   Psychiatric:         Mood and Affect: Mood normal.         Behavior: Behavior normal.       Lab Results   Component Value Date    GLUCOSE 83 05/25/2024    BUN 11 05/25/2024    CREATININE 0.85 05/25/2024    BCR 12.9 05/25/2024    K 4.3 05/25/2024    CO2 26.0 05/25/2024    CALCIUM 8.4 (L) 05/25/2024     Lab Results   Component Value Date    WBC 4.37 05/25/2024    RBC 3.53 (L) 05/25/2024    HGB 10.9 (L) 05/25/2024    HCT 34.0 05/25/2024    MCV 96.3 05/25/2024     05/25/2024     Assessment and Plan     Assessment & Plan  Hypertension, essential  Remains elevated with systolics anywhere from 150-190s  Patient has multiple medication intolerances:  Did not tolerate HCTZ secondary to hyponatremia  Did not tolerate calcium channel blocker secondary to lower extremity edema  Recently placed on spironolactone 25 mg daily, will repeat BMP today prior to increasing to 50 mg daily  Continue valsartan 320 mg daily  Restart hydralazine 50 mg twice daily (patient states at 100 mg she experiences significant abdominal distention)  Monitor blood pressure " twice daily & bring log to next appt  Will reassess blood pressure in 2-4 weeks    Orders:    Basic Metabolic Panel; Future    Coronary artery disease due to lipid rich plaque  CCS 0  Continue Xarelto 20 mg daily secondary to atrial fibrillation  Hold metoprolol succinate 25 mg daily for now secondary to bradycardia  Continue pravastatin 80 mg daily       Paroxysmal atrial fibrillation  [id number] 6  Denies palpitations or bleeding issues  Continue amiodarone 100 mg daily  Hold metoprolol succinate 25 mg daily for now secondary to bradycardia  Continue Xarelto 20 mg daily       Follow Up  Return in about 4 weeks (around 8/29/2025).    Geno Alonso, APRN

## 2025-08-01 ENCOUNTER — LAB (OUTPATIENT)
Facility: HOSPITAL | Age: OVER 89
End: 2025-08-01
Payer: MEDICARE

## 2025-08-01 ENCOUNTER — OFFICE VISIT (OUTPATIENT)
Age: OVER 89
End: 2025-08-01
Payer: MEDICARE

## 2025-08-01 VITALS
BODY MASS INDEX: 32.75 KG/M2 | WEIGHT: 203.8 LBS | HEART RATE: 63 BPM | SYSTOLIC BLOOD PRESSURE: 185 MMHG | HEIGHT: 66 IN | DIASTOLIC BLOOD PRESSURE: 72 MMHG

## 2025-08-01 DIAGNOSIS — I25.83 CORONARY ARTERY DISEASE DUE TO LIPID RICH PLAQUE: ICD-10-CM

## 2025-08-01 DIAGNOSIS — I10 HYPERTENSION, ESSENTIAL: Primary | ICD-10-CM

## 2025-08-01 DIAGNOSIS — I25.10 CORONARY ARTERY DISEASE DUE TO LIPID RICH PLAQUE: ICD-10-CM

## 2025-08-01 DIAGNOSIS — I48.0 PAROXYSMAL ATRIAL FIBRILLATION: ICD-10-CM

## 2025-08-01 DIAGNOSIS — I10 HYPERTENSION, ESSENTIAL: ICD-10-CM

## 2025-08-01 PROCEDURE — 80048 BASIC METABOLIC PNL TOTAL CA: CPT

## 2025-08-01 PROCEDURE — 36415 COLL VENOUS BLD VENIPUNCTURE: CPT

## 2025-08-01 NOTE — ASSESSMENT & PLAN NOTE
[id number]  Denies palpitations or bleeding issues  Continue amiodarone 100 mg daily  Hold metoprolol succinate 25 mg daily for now secondary to bradycardia  Continue Xarelto 20 mg daily

## 2025-08-02 LAB
ANION GAP SERPL CALCULATED.3IONS-SCNC: 13 MMOL/L (ref 5–15)
BUN SERPL-MCNC: 14 MG/DL (ref 8–23)
BUN/CREAT SERPL: 14 (ref 7–25)
CALCIUM SPEC-SCNC: 9.5 MG/DL (ref 8.2–9.6)
CHLORIDE SERPL-SCNC: 98 MMOL/L (ref 98–107)
CO2 SERPL-SCNC: 25 MMOL/L (ref 22–29)
CREAT SERPL-MCNC: 1 MG/DL (ref 0.57–1)
EGFRCR SERPLBLD CKD-EPI 2021: 53.6 ML/MIN/1.73
GLUCOSE SERPL-MCNC: 99 MG/DL (ref 65–99)
POTASSIUM SERPL-SCNC: 4.4 MMOL/L (ref 3.5–5.2)
SODIUM SERPL-SCNC: 136 MMOL/L (ref 136–145)

## 2025-08-04 ENCOUNTER — RESULTS FOLLOW-UP (OUTPATIENT)
Age: OVER 89
End: 2025-08-04
Payer: MEDICARE

## 2025-08-06 RX ORDER — CLONIDINE HYDROCHLORIDE 0.1 MG/1
0.1 TABLET ORAL DAILY
Qty: 90 TABLET | Refills: 1 | Status: SHIPPED | OUTPATIENT
Start: 2025-08-06

## 2025-08-14 ENCOUNTER — OFFICE VISIT (OUTPATIENT)
Age: OVER 89
End: 2025-08-14
Payer: MEDICARE

## 2025-08-14 VITALS
WEIGHT: 199 LBS | SYSTOLIC BLOOD PRESSURE: 168 MMHG | DIASTOLIC BLOOD PRESSURE: 85 MMHG | HEIGHT: 66 IN | BODY MASS INDEX: 31.98 KG/M2 | HEART RATE: 69 BPM

## 2025-08-14 DIAGNOSIS — R00.1 BRADYCARDIA, DRUG INDUCED: ICD-10-CM

## 2025-08-14 DIAGNOSIS — T50.905A BRADYCARDIA, DRUG INDUCED: ICD-10-CM

## 2025-08-14 DIAGNOSIS — I48.0 PAROXYSMAL ATRIAL FIBRILLATION: ICD-10-CM

## 2025-08-14 DIAGNOSIS — N18.31 STAGE 3A CHRONIC KIDNEY DISEASE: ICD-10-CM

## 2025-08-14 DIAGNOSIS — D64.9 ANEMIA, UNSPECIFIED TYPE: Primary | ICD-10-CM

## 2025-08-14 DIAGNOSIS — I10 HYPERTENSION, ESSENTIAL: ICD-10-CM

## 2025-08-14 DIAGNOSIS — I10 ESSENTIAL HYPERTENSION: ICD-10-CM

## 2025-08-14 RX ORDER — L.ACID,CASEI/B.ANIMAL/S.THERMO 16B CELL
1 CAPSULE ORAL DAILY
COMMUNITY

## 2025-08-14 RX ORDER — VALSARTAN 320 MG/1
320 TABLET ORAL DAILY
Qty: 90 TABLET | Refills: 1 | Status: SHIPPED | OUTPATIENT
Start: 2025-08-14

## 2025-08-14 RX ORDER — HYDRALAZINE HYDROCHLORIDE 100 MG/1
100 TABLET, FILM COATED ORAL 2 TIMES DAILY
COMMUNITY

## 2025-08-15 ENCOUNTER — TELEPHONE (OUTPATIENT)
Age: OVER 89
End: 2025-08-15
Payer: MEDICARE

## (undated) DEVICE — LIMB HOLDER, WRIST/ANKLE: Brand: DEROYAL

## (undated) DEVICE — VIATRAC 14 PLUS PERIPHERAL DILATATION CATHETER 6.0 MM X 30 MM X 135 CM: Brand: VIATRAC

## (undated) DEVICE — CATH TEMPO 5F BER 100CM: Brand: TEMPO

## (undated) DEVICE — ST ACC MICROPUNCTURE .018 TRANSLSS/SS/TP 5F/10CM 21G/7CM

## (undated) DEVICE — INTRO SHEATH FLX 7F80CM

## (undated) DEVICE — RADIFOCUS TORQUE DEVICE MULTI-TORQUE VISE: Brand: RADIFOCUS TORQUE DEVICE

## (undated) DEVICE — RADIFOCUS GLIDEWIRE: Brand: GLIDEWIRE

## (undated) DEVICE — GW PTFE FIXCORE STFF J/3MM .035 150CM

## (undated) DEVICE — STPCK LP 1WY RA 200PSI

## (undated) DEVICE — Device

## (undated) DEVICE — ROTATING HEMOSTATIC VALVE .096": Brand: RHV

## (undated) DEVICE — EMBOSHIELD NAV6 EMBOLIC PROTECTION SYSTEM 7.2 MM X 190 CM: Brand: EMBOSHIELD  NAV6

## (undated) DEVICE — INTRO SHEATH ART/FEM ENGAGE .038 5F12CM

## (undated) DEVICE — LEX NEURO ANGIOGRAPHY: Brand: MEDLINE INDUSTRIES, INC.

## (undated) DEVICE — ANGIO-SEAL EVOLUTION VASCULAR CLOSURE DEVICE: Brand: ANGIO-SEAL

## (undated) DEVICE — DEV INFL MONARCH 25W